# Patient Record
Sex: MALE | Race: BLACK OR AFRICAN AMERICAN | Employment: UNEMPLOYED | URBAN - METROPOLITAN AREA
[De-identification: names, ages, dates, MRNs, and addresses within clinical notes are randomized per-mention and may not be internally consistent; named-entity substitution may affect disease eponyms.]

---

## 2020-12-11 ENCOUNTER — HOSPITAL ENCOUNTER (EMERGENCY)
Age: 40
Discharge: HOME | End: 2020-12-11
Attending: EMERGENCY MEDICINE
Payer: COMMERCIAL

## 2020-12-11 VITALS
TEMPERATURE: 96.9 F | HEART RATE: 77 BPM | DIASTOLIC BLOOD PRESSURE: 109 MMHG | RESPIRATION RATE: 16 BRPM | OXYGEN SATURATION: 96 % | SYSTOLIC BLOOD PRESSURE: 156 MMHG

## 2020-12-11 LAB
ABSOLUTE EOS #: 0.16 K/UL (ref 0–0.44)
ABSOLUTE IMMATURE GRANULOCYTE: 0.03 K/UL (ref 0–0.3)
ABSOLUTE LYMPH #: 2.94 K/UL (ref 1.1–3.7)
ABSOLUTE MONO #: 0.9 K/UL (ref 0.1–1.2)
ALBUMIN SERPL-MCNC: 4.4 G/DL (ref 3.5–5.2)
ALBUMIN/GLOBULIN RATIO: 1.3 (ref 1–2.5)
ALP BLD-CCNC: 45 U/L (ref 40–129)
ALT SERPL-CCNC: 26 U/L (ref 5–41)
ANION GAP SERPL CALCULATED.3IONS-SCNC: 13 MMOL/L (ref 9–17)
AST SERPL-CCNC: 20 U/L
BASOPHILS # BLD: 1 % (ref 0–2)
BASOPHILS ABSOLUTE: 0.08 K/UL (ref 0–0.2)
BILIRUB SERPL-MCNC: 1.55 MG/DL (ref 0.3–1.2)
BUN BLDV-MCNC: 12 MG/DL (ref 6–20)
BUN/CREAT BLD: ABNORMAL (ref 9–20)
CALCIUM SERPL-MCNC: 9.4 MG/DL (ref 8.6–10.4)
CHLORIDE BLD-SCNC: 98 MMOL/L (ref 98–107)
CO2: 24 MMOL/L (ref 20–31)
CREAT SERPL-MCNC: 0.9 MG/DL (ref 0.7–1.2)
DIFFERENTIAL TYPE: ABNORMAL
EOSINOPHILS RELATIVE PERCENT: 1 % (ref 1–4)
GFR AFRICAN AMERICAN: >60 ML/MIN
GFR NON-AFRICAN AMERICAN: >60 ML/MIN
GFR SERPL CREATININE-BSD FRML MDRD: ABNORMAL ML/MIN/{1.73_M2}
GFR SERPL CREATININE-BSD FRML MDRD: ABNORMAL ML/MIN/{1.73_M2}
GLUCOSE BLD-MCNC: 134 MG/DL (ref 70–99)
HCT VFR BLD CALC: 42.8 % (ref 40.7–50.3)
HEMOGLOBIN: 14.2 G/DL (ref 13–17)
IMMATURE GRANULOCYTES: 0 %
LIPASE: 66 U/L (ref 13–60)
LYMPHOCYTES # BLD: 24 % (ref 24–43)
MCH RBC QN AUTO: 30.9 PG (ref 25.2–33.5)
MCHC RBC AUTO-ENTMCNC: 33.2 G/DL (ref 28.4–34.8)
MCV RBC AUTO: 93.2 FL (ref 82.6–102.9)
MONOCYTES # BLD: 8 % (ref 3–12)
NRBC AUTOMATED: 0 PER 100 WBC
PDW BLD-RTO: 11.9 % (ref 11.8–14.4)
PLATELET # BLD: 347 K/UL (ref 138–453)
PLATELET ESTIMATE: ABNORMAL
PMV BLD AUTO: 11 FL (ref 8.1–13.5)
POTASSIUM SERPL-SCNC: 3.5 MMOL/L (ref 3.7–5.3)
RBC # BLD: 4.59 M/UL (ref 4.21–5.77)
RBC # BLD: ABNORMAL 10*6/UL
SEG NEUTROPHILS: 66 % (ref 36–65)
SEGMENTED NEUTROPHILS ABSOLUTE COUNT: 7.94 K/UL (ref 1.5–8.1)
SODIUM BLD-SCNC: 135 MMOL/L (ref 135–144)
TOTAL PROTEIN: 7.7 G/DL (ref 6.4–8.3)
WBC # BLD: 12.1 K/UL (ref 3.5–11.3)
WBC # BLD: ABNORMAL 10*3/UL

## 2020-12-11 PROCEDURE — 96375 TX/PRO/DX INJ NEW DRUG ADDON: CPT

## 2020-12-11 PROCEDURE — 6370000000 HC RX 637 (ALT 250 FOR IP): Performed by: STUDENT IN AN ORGANIZED HEALTH CARE EDUCATION/TRAINING PROGRAM

## 2020-12-11 PROCEDURE — 2580000003 HC RX 258: Performed by: STUDENT IN AN ORGANIZED HEALTH CARE EDUCATION/TRAINING PROGRAM

## 2020-12-11 PROCEDURE — 99284 EMERGENCY DEPT VISIT MOD MDM: CPT

## 2020-12-11 PROCEDURE — 6360000002 HC RX W HCPCS: Performed by: STUDENT IN AN ORGANIZED HEALTH CARE EDUCATION/TRAINING PROGRAM

## 2020-12-11 PROCEDURE — 2500000003 HC RX 250 WO HCPCS: Performed by: STUDENT IN AN ORGANIZED HEALTH CARE EDUCATION/TRAINING PROGRAM

## 2020-12-11 PROCEDURE — 85025 COMPLETE CBC W/AUTO DIFF WBC: CPT

## 2020-12-11 PROCEDURE — 96374 THER/PROPH/DIAG INJ IV PUSH: CPT

## 2020-12-11 PROCEDURE — 83690 ASSAY OF LIPASE: CPT

## 2020-12-11 PROCEDURE — 80053 COMPREHEN METABOLIC PANEL: CPT

## 2020-12-11 PROCEDURE — 93005 ELECTROCARDIOGRAM TRACING: CPT | Performed by: STUDENT IN AN ORGANIZED HEALTH CARE EDUCATION/TRAINING PROGRAM

## 2020-12-11 RX ORDER — ONDANSETRON 2 MG/ML
4 INJECTION INTRAMUSCULAR; INTRAVENOUS ONCE
Status: COMPLETED | OUTPATIENT
Start: 2020-12-11 | End: 2020-12-11

## 2020-12-11 RX ORDER — PANTOPRAZOLE SODIUM 20 MG/1
40 TABLET, DELAYED RELEASE ORAL 2 TIMES DAILY
Qty: 30 TABLET | Refills: 0 | Status: ON HOLD | OUTPATIENT
Start: 2020-12-11 | End: 2021-03-28 | Stop reason: HOSPADM

## 2020-12-11 RX ORDER — ALUMINA, MAGNESIA, AND SIMETHICONE 2400; 2400; 240 MG/30ML; MG/30ML; MG/30ML
15 SUSPENSION ORAL EVERY 6 HOURS PRN
Qty: 1 BOTTLE | Refills: 0 | Status: SHIPPED | OUTPATIENT
Start: 2020-12-11 | End: 2021-07-27 | Stop reason: ALTCHOICE

## 2020-12-11 RX ORDER — MAGNESIUM HYDROXIDE/ALUMINUM HYDROXICE/SIMETHICONE 120; 1200; 1200 MG/30ML; MG/30ML; MG/30ML
30 SUSPENSION ORAL ONCE
Status: COMPLETED | OUTPATIENT
Start: 2020-12-11 | End: 2020-12-11

## 2020-12-11 RX ORDER — 0.9 % SODIUM CHLORIDE 0.9 %
1000 INTRAVENOUS SOLUTION INTRAVENOUS ONCE
Status: COMPLETED | OUTPATIENT
Start: 2020-12-11 | End: 2020-12-11

## 2020-12-11 RX ADMIN — ONDANSETRON 4 MG: 2 INJECTION INTRAMUSCULAR; INTRAVENOUS at 07:01

## 2020-12-11 RX ADMIN — FAMOTIDINE 20 MG: 10 INJECTION INTRAVENOUS at 07:01

## 2020-12-11 RX ADMIN — SODIUM CHLORIDE 1000 ML: 9 INJECTION, SOLUTION INTRAVENOUS at 07:01

## 2020-12-11 RX ADMIN — ALUMINUM HYDROXIDE, MAGNESIUM HYDROXIDE, AND SIMETHICONE 30 ML: 200; 200; 20 SUSPENSION ORAL at 06:58

## 2020-12-11 ASSESSMENT — ENCOUNTER SYMPTOMS
NAUSEA: 1
SHORTNESS OF BREATH: 0
EYE ITCHING: 0
EYE REDNESS: 0
DIARRHEA: 0
RHINORRHEA: 0
CONSTIPATION: 0
BLOOD IN STOOL: 0
SORE THROAT: 0
VOMITING: 1
COUGH: 0
ABDOMINAL PAIN: 1

## 2020-12-11 ASSESSMENT — PAIN DESCRIPTION - LOCATION: LOCATION: ABDOMEN

## 2020-12-11 ASSESSMENT — PAIN SCALES - GENERAL: PAINLEVEL_OUTOF10: 8

## 2020-12-11 ASSESSMENT — PAIN DESCRIPTION - ORIENTATION: ORIENTATION: MID

## 2020-12-11 ASSESSMENT — PAIN DESCRIPTION - PAIN TYPE: TYPE: ACUTE PAIN;CHRONIC PAIN

## 2020-12-11 NOTE — ED PROVIDER NOTES
[facility]  ED  Emergency Department Encounter  Emergency Medicine Resident     Pt Name: Ramón Moura  MRN: [id number]  [place] 1980  Date ofevaluation: 12/11/20  PCP:  No primary care provider on file. CHIEF COMPLAINT       Chief Complaint   Patient presents with    Abdominal Pain     ulcers, midsternal     HISTORY OF PRESENT ILLNESS  (Location/Symptom, Timing/Onset, Context/Setting, Quality, Duration, Modifying Factors, Severity, Associated signs/symptoms)     Ramón Moura is a 38 y.o. male who presents with \"stomach ulcers \". Patient reports that for last several days he has had pain in his epigastric region that has had before from what he believes are stomach ulcers. He has never had a EGD performed, states these are stomach ulcers from the symptoms that he looked up online. He reports that his pain is been constant since onset and was accompanied by some coffee-ground emesis approximately 2 days ago. Pain does not radiate and is not accompanied with diarrhea or melena or bloody stools. No fevers, chills, chest pain, shortness of breath, or other concerns. He does smoke tobacco and occasionally use marijuana but not daily. Does also occasional use alcohol none recently. No prior history of abdominal surgeries. PAST MEDICAL / SURGICAL / SOCIAL / FAMILY HISTORY      has no past medical history on file. has no past surgical history on file.     Social History     Socioeconomic History    Marital status: Single     Spouse name: Not on file    Number of children: Not on file    Years of education: Not on file    Highest education level: Not on file   Occupational History    Not on file   Social Needs    Financial resource strain: Not on file    Food insecurity     Worry: Not on file     Inability: Not on file    Transportation needs     Medical: Not on file     Non-medical: Not on file   Tobacco Use    Smoking status: Current Every Day Smoker     Packs/day: 0.50     Years: 20.00     Pack years: 10.00     Types: Cigarettes   Substance and Sexual Activity    Alcohol use: Yes     Comment: socially     Drug use: Yes     Types: Marijuana     Comment: occasionally     Sexual activity: Not on file   Lifestyle    Physical activity     Days per week: Not on file     Minutes per session: Not on file    Stress: Not on file   Relationships    Social connections     Talks on phone: Not on file     Gets together: Not on file     Attends Scientologist service: Not on file     Active member of club or organization: Not on file     Attends meetings of clubs or organizations: Not on file     Relationship status: Not on file    Intimate partner violence     Fear of current or ex partner: Not on file     Emotionally abused: Not on file     Physically abused: Not on file     Forced sexual activity: Not on file   Other Topics Concern    Not on file   Social History Narrative    Not on file       History reviewed. No pertinent family history. Allergies:  Patient has no allergy information on record. Home Medications:  Prior to Admission medications    Medication Sig Start Date End Date Taking? Authorizing Provider   pantoprazole (PROTONIX) 20 MG tablet Take 2 tablets by mouth 2 times daily 12/11/20  Yes Misael Peña, DO   aluminum & magnesium hydroxide-simethicone (MAALOX ADVANCED MAX ST) 400-400-40 MG/5ML SUSP Take 15 mLs by mouth every 6 hours as needed (Heartburn) 12/11/20  Yes Misael Peña, DO       REVIEW OF SYSTEMS    (2-9 systems for level 4, 10 or more for level 5)      Review of Systems   Constitutional: Negative for chills and fever. HENT: Negative for rhinorrhea and sore throat. Eyes: Negative for redness and itching. Respiratory: Negative for cough and shortness of breath. Cardiovascular: Negative for chest pain. Gastrointestinal: Positive for abdominal pain, nausea and vomiting. Negative for blood in stool, constipation and diarrhea. Genitourinary: Negative for dysuria and hematuria. Skin: Negative for rash and wound. Allergic/Immunologic: Negative for environmental allergies and food allergies. Neurological: Negative for weakness and numbness. PHYSICAL EXAM   (up to 7 for level 4, 8 or more for level 5)      INITIAL VITALS:   BP (!) 156/109   Pulse 77   Temp 96.9 °F (36.1 °C) (Temporal)   Resp 16   SpO2 96%     Physical Exam  Nursing note reviewed. Constitutional:       General: He is not in acute distress. Appearance: Normal appearance. He is obese. He is not ill-appearing, toxic-appearing or diaphoretic. HENT:      Head: Normocephalic and atraumatic. Eyes:      General: No scleral icterus. Conjunctiva/sclera: Conjunctivae normal.   Neck:      Musculoskeletal: Neck supple. Cardiovascular:      Rate and Rhythm: Normal rate and regular rhythm. Pulmonary:      Effort: Pulmonary effort is normal. No respiratory distress. Breath sounds: Normal breath sounds. No stridor. No wheezing, rhonchi or rales. Abdominal:      General: There is no distension. Palpations: Abdomen is soft. There is no mass. Tenderness: There is abdominal tenderness (mild) in the right upper quadrant, epigastric area and left upper quadrant. There is no right CVA tenderness, left CVA tenderness, guarding or rebound. Musculoskeletal:      Right lower leg: No edema. Left lower leg: No edema. Skin:     General: Skin is warm and dry. Findings: No rash (over exposed skin). Neurological:      General: No focal deficit present. Mental Status: He is alert and oriented to person, place, and time.    Psychiatric:         Mood and Affect: Mood normal.         Behavior: Behavior normal.         DIAGNOSTICS     PLAN (LABS / IMAGING / EKG):  Orders Placed This Encounter   Procedures    CBC WITH AUTO DIFFERENTIAL    COMPREHENSIVE METABOLIC PANEL    LIPASE    EKG 12 Lead       MEDICATIONS ORDERED:  Orders Placed This Encounter   Medications    0.9 % sodium chloride bolus    aluminum & magnesium hydroxide-simethicone (MAALOX) 200-200-20 MG/5ML suspension 30 mL    ondansetron (ZOFRAN) injection 4 mg    famotidine (PEPCID) injection 20 mg    pantoprazole (PROTONIX) 20 MG tablet     Sig: Take 2 tablets by mouth 2 times daily     Dispense:  30 tablet     Refill:  0    aluminum & magnesium hydroxide-simethicone (MAALOX ADVANCED MAX ST) 400-400-40 MG/5ML SUSP     Sig: Take 15 mLs by mouth every 6 hours as needed (Heartburn)     Dispense:  1 Bottle     Refill:  0       DIAGNOSTIC RESULTS / EMERGENCYDEPARTMENT COURSE / MDM     LABS:  Results for orders placed or performed during the hospital encounter of 12/11/20   CBC WITH AUTO DIFFERENTIAL   Result Value Ref Range    WBC 12.1 (H) 3.5 - 11.3 k/uL    RBC 4.59 4.21 - 5.77 m/uL    Hemoglobin 14.2 13.0 - 17.0 g/dL    Hematocrit 42.8 40.7 - 50.3 %    MCV 93.2 82.6 - 102.9 fL    MCH 30.9 25.2 - 33.5 pg    MCHC 33.2 28.4 - 34.8 g/dL    RDW 11.9 11.8 - 14.4 %    Platelets 960 196 - 866 k/uL    MPV 11.0 8.1 - 13.5 fL    NRBC Automated 0.0 0.0 per 100 WBC    Differential Type NOT REPORTED     Seg Neutrophils 66 (H) 36 - 65 %    Lymphocytes 24 24 - 43 %    Monocytes 8 3 - 12 %    Eosinophils % 1 1 - 4 %    Basophils 1 0 - 2 %    Immature Granulocytes 0 0 %    Segs Absolute 7.94 1.50 - 8.10 k/uL    Absolute Lymph # 2.94 1.10 - 3.70 k/uL    Absolute Mono # 0.90 0.10 - 1.20 k/uL    Absolute Eos # 0.16 0.00 - 0.44 k/uL    Basophils Absolute 0.08 0.00 - 0.20 k/uL    Absolute Immature Granulocyte 0.03 0.00 - 0.30 k/uL    WBC Morphology NOT REPORTED     RBC Morphology NOT REPORTED     Platelet Estimate NOT REPORTED    COMPREHENSIVE METABOLIC PANEL   Result Value Ref Range    Glucose 134 (H) 70 - 99 mg/dL    BUN 12 6 - 20 mg/dL    CREATININE 0.90 0.70 - 1.20 mg/dL    Bun/Cre Ratio NOT REPORTED 9 - 20    Calcium 9.4 8.6 - 10.4 mg/dL    Sodium 135 135 - 144 mmol/L    Potassium 3.5 (L) 3.7 - 5.3 mmol/L Chloride 98 98 - 107 mmol/L    CO2 24 20 - 31 mmol/L    Anion Gap 13 9 - 17 mmol/L    Alkaline Phosphatase 45 40 - 129 U/L    ALT 26 5 - 41 U/L    AST 20 <40 U/L    Total Bilirubin 1.55 (H) 0.3 - 1.2 mg/dL    Total Protein 7.7 6.4 - 8.3 g/dL    Alb 4.4 3.5 - 5.2 g/dL    Albumin/Globulin Ratio 1.3 1.0 - 2.5    GFR Non-African American >60 >60 mL/min    GFR African American >60 >60 mL/min    GFR Comment          GFR Staging NOT REPORTED    LIPASE   Result Value Ref Range    Lipase 66 (H) 13 - 60 U/L       RADIOLOGY:  No orders to display      EKG  Rhythm: normal sinus   Rate: normal  Axis: normal  Ectopy: none  Conduction: normal  ST Segments: normal  T Waves: Poor T wave balance, TWI in III, otherwise normal  Q Waves: none    Clinical Impression: non-specific EKG    Normal Interval Reference:  P-wave <110 ms  -200 ms  QRS <100 ms  QT <420 ms  QTc 330-470 ms    All EKG's are interpreted by the Emergency Department Physician who either signs or Co-signsthis chart in the absence of a cardiologist.    EMERGENCY DEPARTMENT COURSE:         MDM: 25-year-old male presenting with acute onset epigastric dental pain that he states is from stomach ulcers although no prior history of EGDs or other formal diagnosis of such. On exam he is well-appearing no acute distress, vitals unremarkable. Heart regular rate and rhythm, lungs are clear to auscultation bilaterally abdomen soft with some mild tenderness in epigastric and right upper quadrant and left upper quadrant region. No rebound or guarding noted. No CVA tenderness. No lower extremity edema noted. Differential diagnose includes gastritis, peptic ulcer disease, pancreatitis, cholecystitis, ACS, among others. Plan is for abdominal labs, EKG, symptomatic treatment and reassess. Work-up unremarkable patient feels significantly improved after Maalox and Zofran. Impression is likely gastritis versus PUD.   Repeat abdominal exam is much improved without any

## 2020-12-11 NOTE — ED PROVIDER NOTES
[facility] ED  eMERGENCY dEPARTMENT eNCOUnter   Attending Attestation     Pt Name: Ellis Canas  MRN: [id number]  [place] 1980  Date of evaluation: 12/11/20       Ellis Canas is a 38 y.o. male who presents with Abdominal Pain (ulcers, midsternal)      History: Pt presents with epigastric pain pt states he has ulcers. Exam: HRRR, lungs CTABL, abdomen soft and tender over epigastrium. Pt well appearing. Plan for labs and treatment. Plan for discharge if negative workup. EKG Interpretation    Interpreted by emergency department physician    Rhythm: normal sinus   Rate: normal  Axis: normal  Ectopy: none  Conduction: normal  ST Segments: no acute change  T Waves: inversion lead 3  Q Waves: none    Clinical Impression: non-specific EKG      Leandro De Paz M.D. I performed a history and physical examination of the patient and discussed management with the resident. I reviewed the residents note and agree with the documented findings and plan of care. Any areas of disagreement are noted on the chart. I was personally present for the key portions of any procedures. I have documented in the chart those procedures where I was not present during the key portions. I have personally reviewed all images and agree with the resident's interpretation. I have reviewed the emergency nurses triage note. I agree with the chief complaint, past medical history, past surgical history, allergies, medications, social and family history as documented unless otherwise noted below. Documentation of the HPI, Physical Exam and Medical Decision Making performed by medical students or scribes is based on my personal performance of the HPI, PE and MDM. For Phys Assistant/ Nurse Practitioner cases/documentation I have had a face to face evaluation of this patient and have completed at least one if not all key elements of the E/M (history, physical exam, and MDM). Additional findings are as noted.     For [employer] cases I have personally evaluated and examined the patient in conjunction with the APC and agree with the treatment plan and disposition of the patient as recorded by the APC.     Jared De Santiago MD  Attending Emergency  Physician       Aaliyah Thorpe MD  12/11/20 [facility] [place]  12/11/20 2501

## 2020-12-11 NOTE — ED TRIAGE NOTES
Pt came in having midsternal abdominal pain with nausea. PT states it has been going on a week worsening since Sunday morning. Pt states has hx of stomach ulcers. Vitals completed. EKG done and connected to cardiac monitor.

## 2020-12-12 LAB
EKG ATRIAL RATE: 74 BPM
EKG P AXIS: 47 DEGREES
EKG P-R INTERVAL: 126 MS
EKG Q-T INTERVAL: 390 MS
EKG QRS DURATION: 104 MS
EKG QTC CALCULATION (BAZETT): 432 MS
EKG R AXIS: 66 DEGREES
EKG T AXIS: 9 DEGREES
EKG VENTRICULAR RATE: 74 BPM

## 2021-03-25 ENCOUNTER — HOSPITAL ENCOUNTER (INPATIENT)
Age: 41
LOS: 3 days | Discharge: HOME | DRG: 254 | End: 2021-03-28
Attending: EMERGENCY MEDICINE | Admitting: INTERNAL MEDICINE
Payer: COMMERCIAL

## 2021-03-25 DIAGNOSIS — E86.0 DEHYDRATION: ICD-10-CM

## 2021-03-25 DIAGNOSIS — K92.1 GASTROINTESTINAL HEMORRHAGE WITH MELENA: Primary | ICD-10-CM

## 2021-03-25 PROBLEM — K92.2 GI BLEED: Status: ACTIVE | Noted: 2021-03-25

## 2021-03-25 LAB
ABSOLUTE EOS #: 0.07 K/UL (ref 0–0.44)
ABSOLUTE IMMATURE GRANULOCYTE: 0.05 K/UL (ref 0–0.3)
ABSOLUTE LYMPH #: 2.4 K/UL (ref 1.1–3.7)
ABSOLUTE MONO #: 0.9 K/UL (ref 0.1–1.2)
ALBUMIN SERPL-MCNC: 4.7 G/DL (ref 3.5–5.2)
ALBUMIN/GLOBULIN RATIO: 1.2 (ref 1–2.5)
ALP BLD-CCNC: 54 U/L (ref 40–129)
ALT SERPL-CCNC: 18 U/L (ref 5–41)
ANION GAP SERPL CALCULATED.3IONS-SCNC: 14 MMOL/L (ref 9–17)
AST SERPL-CCNC: 16 U/L
BASOPHILS # BLD: 1 % (ref 0–2)
BASOPHILS ABSOLUTE: 0.08 K/UL (ref 0–0.2)
BILIRUB SERPL-MCNC: 0.96 MG/DL (ref 0.3–1.2)
BUN BLDV-MCNC: 23 MG/DL (ref 6–20)
BUN/CREAT BLD: ABNORMAL (ref 9–20)
CALCIUM SERPL-MCNC: 10.5 MG/DL (ref 8.6–10.4)
CHLORIDE BLD-SCNC: 92 MMOL/L (ref 98–107)
CO2: 26 MMOL/L (ref 20–31)
CREAT SERPL-MCNC: 1.02 MG/DL (ref 0.7–1.2)
DIFFERENTIAL TYPE: ABNORMAL
EOSINOPHILS RELATIVE PERCENT: 1 % (ref 1–4)
GFR AFRICAN AMERICAN: >60 ML/MIN
GFR NON-AFRICAN AMERICAN: >60 ML/MIN
GFR SERPL CREATININE-BSD FRML MDRD: ABNORMAL ML/MIN/{1.73_M2}
GFR SERPL CREATININE-BSD FRML MDRD: ABNORMAL ML/MIN/{1.73_M2}
GLUCOSE BLD-MCNC: 151 MG/DL (ref 70–99)
HCT VFR BLD CALC: 48.2 % (ref 40.7–50.3)
HEMOGLOBIN: 16.5 G/DL (ref 13–17)
IMMATURE GRANULOCYTES: 0 %
INR BLD: 1.1
LACTIC ACID, WHOLE BLOOD: 2.1 MMOL/L (ref 0.7–2.1)
LIPASE: 70 U/L (ref 13–60)
LYMPHOCYTES # BLD: 16 % (ref 24–43)
MAGNESIUM: 2.3 MG/DL (ref 1.6–2.6)
MCH RBC QN AUTO: 30.8 PG (ref 25.2–33.5)
MCHC RBC AUTO-ENTMCNC: 34.2 G/DL (ref 28.4–34.8)
MCV RBC AUTO: 89.9 FL (ref 82.6–102.9)
MONOCYTES # BLD: 6 % (ref 3–12)
NRBC AUTOMATED: 0.7 PER 100 WBC
PARTIAL THROMBOPLASTIN TIME: 24.1 SEC (ref 20.5–30.5)
PDW BLD-RTO: 12.2 % (ref 11.8–14.4)
PLATELET # BLD: 435 K/UL (ref 138–453)
PLATELET ESTIMATE: ABNORMAL
PMV BLD AUTO: 10.8 FL (ref 8.1–13.5)
POTASSIUM SERPL-SCNC: 3.4 MMOL/L (ref 3.7–5.3)
PROTHROMBIN TIME: 11.5 SEC (ref 9.1–12.3)
RBC # BLD: 5.36 M/UL (ref 4.21–5.77)
RBC # BLD: ABNORMAL 10*6/UL
SEG NEUTROPHILS: 76 % (ref 36–65)
SEGMENTED NEUTROPHILS ABSOLUTE COUNT: 11.35 K/UL (ref 1.5–8.1)
SODIUM BLD-SCNC: 132 MMOL/L (ref 135–144)
TOTAL PROTEIN: 8.7 G/DL (ref 6.4–8.3)
WBC # BLD: 14.9 K/UL (ref 3.5–11.3)
WBC # BLD: ABNORMAL 10*3/UL

## 2021-03-25 PROCEDURE — 2580000003 HC RX 258: Performed by: STUDENT IN AN ORGANIZED HEALTH CARE EDUCATION/TRAINING PROGRAM

## 2021-03-25 PROCEDURE — C9113 INJ PANTOPRAZOLE SODIUM, VIA: HCPCS | Performed by: STUDENT IN AN ORGANIZED HEALTH CARE EDUCATION/TRAINING PROGRAM

## 2021-03-25 PROCEDURE — G0378 HOSPITAL OBSERVATION PER HR: HCPCS

## 2021-03-25 PROCEDURE — 85610 PROTHROMBIN TIME: CPT

## 2021-03-25 PROCEDURE — 99285 EMERGENCY DEPT VISIT HI MDM: CPT

## 2021-03-25 PROCEDURE — 83735 ASSAY OF MAGNESIUM: CPT

## 2021-03-25 PROCEDURE — 6360000002 HC RX W HCPCS: Performed by: STUDENT IN AN ORGANIZED HEALTH CARE EDUCATION/TRAINING PROGRAM

## 2021-03-25 PROCEDURE — 96361 HYDRATE IV INFUSION ADD-ON: CPT

## 2021-03-25 PROCEDURE — 83690 ASSAY OF LIPASE: CPT

## 2021-03-25 PROCEDURE — 93005 ELECTROCARDIOGRAM TRACING: CPT | Performed by: STUDENT IN AN ORGANIZED HEALTH CARE EDUCATION/TRAINING PROGRAM

## 2021-03-25 PROCEDURE — 96365 THER/PROPH/DIAG IV INF INIT: CPT

## 2021-03-25 PROCEDURE — 96375 TX/PRO/DX INJ NEW DRUG ADDON: CPT

## 2021-03-25 PROCEDURE — 83605 ASSAY OF LACTIC ACID: CPT

## 2021-03-25 PROCEDURE — 85025 COMPLETE CBC W/AUTO DIFF WBC: CPT

## 2021-03-25 PROCEDURE — 80053 COMPREHEN METABOLIC PANEL: CPT

## 2021-03-25 PROCEDURE — 1200000000 HC SEMI PRIVATE

## 2021-03-25 PROCEDURE — 85730 THROMBOPLASTIN TIME PARTIAL: CPT

## 2021-03-25 RX ORDER — PANTOPRAZOLE SODIUM 40 MG/10ML
40 INJECTION, POWDER, LYOPHILIZED, FOR SOLUTION INTRAVENOUS ONCE
Status: COMPLETED | OUTPATIENT
Start: 2021-03-25 | End: 2021-03-25

## 2021-03-25 RX ORDER — SODIUM CHLORIDE 9 MG/ML
10 INJECTION INTRAVENOUS ONCE
Status: COMPLETED | OUTPATIENT
Start: 2021-03-25 | End: 2021-03-25

## 2021-03-25 RX ORDER — POTASSIUM CHLORIDE 7.45 MG/ML
10 INJECTION INTRAVENOUS ONCE
Status: COMPLETED | OUTPATIENT
Start: 2021-03-25 | End: 2021-03-26

## 2021-03-25 RX ORDER — SODIUM CHLORIDE, SODIUM LACTATE, POTASSIUM CHLORIDE, CALCIUM CHLORIDE 600; 310; 30; 20 MG/100ML; MG/100ML; MG/100ML; MG/100ML
1000 INJECTION, SOLUTION INTRAVENOUS ONCE
Status: COMPLETED | OUTPATIENT
Start: 2021-03-25 | End: 2021-03-25

## 2021-03-25 RX ORDER — ONDANSETRON 2 MG/ML
4 INJECTION INTRAMUSCULAR; INTRAVENOUS ONCE
Status: COMPLETED | OUTPATIENT
Start: 2021-03-25 | End: 2021-03-25

## 2021-03-25 RX ORDER — MORPHINE SULFATE 4 MG/ML
4 INJECTION, SOLUTION INTRAMUSCULAR; INTRAVENOUS ONCE
Status: COMPLETED | OUTPATIENT
Start: 2021-03-25 | End: 2021-03-25

## 2021-03-25 RX ADMIN — PANTOPRAZOLE SODIUM 40 MG: 40 INJECTION, POWDER, FOR SOLUTION INTRAVENOUS at 22:38

## 2021-03-25 RX ADMIN — MORPHINE SULFATE 4 MG: 4 INJECTION INTRAVENOUS at 22:24

## 2021-03-25 RX ADMIN — ONDANSETRON 4 MG: 2 INJECTION INTRAMUSCULAR; INTRAVENOUS at 22:25

## 2021-03-25 RX ADMIN — POTASSIUM CHLORIDE 10 MEQ: 7.46 INJECTION, SOLUTION INTRAVENOUS at 23:18

## 2021-03-25 RX ADMIN — SODIUM CHLORIDE 10 ML: 9 INJECTION, SOLUTION INTRAMUSCULAR; INTRAVENOUS; SUBCUTANEOUS at 22:38

## 2021-03-25 RX ADMIN — SODIUM CHLORIDE, POTASSIUM CHLORIDE, SODIUM LACTATE AND CALCIUM CHLORIDE 1000 ML: 600; 310; 30; 20 INJECTION, SOLUTION INTRAVENOUS at 22:24

## 2021-03-25 NOTE — Clinical Note
Patient Class: Observation [104]   REQUIRED: Diagnosis: GI bleed [840388]   Estimated Length of Stay: Estimated stay of less than 2 midnights   Telemetry Bed Required?: Yes

## 2021-03-26 ENCOUNTER — ANESTHESIA EVENT (OUTPATIENT)
Dept: ENDOSCOPY | Age: 41
DRG: 254 | End: 2021-03-26
Payer: COMMERCIAL

## 2021-03-26 ENCOUNTER — ANESTHESIA (OUTPATIENT)
Dept: ENDOSCOPY | Age: 41
DRG: 254 | End: 2021-03-26
Payer: COMMERCIAL

## 2021-03-26 ENCOUNTER — APPOINTMENT (OUTPATIENT)
Dept: CT IMAGING | Age: 41
DRG: 254 | End: 2021-03-26
Payer: COMMERCIAL

## 2021-03-26 VITALS
DIASTOLIC BLOOD PRESSURE: 69 MMHG | RESPIRATION RATE: 13 BRPM | SYSTOLIC BLOOD PRESSURE: 118 MMHG | OXYGEN SATURATION: 94 %

## 2021-03-26 PROBLEM — K26.9 DUODENAL ULCER: Status: ACTIVE | Noted: 2021-03-26

## 2021-03-26 PROBLEM — T43.615A CAFFEINE ADVERSE REACTION: Status: ACTIVE | Noted: 2021-03-26

## 2021-03-26 PROBLEM — K92.2 ACUTE GI BLEEDING: Status: ACTIVE | Noted: 2021-03-26

## 2021-03-26 PROBLEM — E66.3 OVERWEIGHT (BMI 25.0-29.9): Status: ACTIVE | Noted: 2021-03-26

## 2021-03-26 PROBLEM — F17.200 SMOKER: Status: ACTIVE | Noted: 2021-03-26

## 2021-03-26 LAB
ANION GAP SERPL CALCULATED.3IONS-SCNC: 10 MMOL/L (ref 9–17)
BUN BLDV-MCNC: 19 MG/DL (ref 6–20)
BUN/CREAT BLD: ABNORMAL (ref 9–20)
CALCIUM SERPL-MCNC: 9 MG/DL (ref 8.6–10.4)
CHLORIDE BLD-SCNC: 98 MMOL/L (ref 98–107)
CO2: 25 MMOL/L (ref 20–31)
CREAT SERPL-MCNC: 0.81 MG/DL (ref 0.7–1.2)
EKG ATRIAL RATE: 108 BPM
EKG P AXIS: 49 DEGREES
EKG P-R INTERVAL: 116 MS
EKG Q-T INTERVAL: 330 MS
EKG QRS DURATION: 102 MS
EKG QTC CALCULATION (BAZETT): 442 MS
EKG R AXIS: 70 DEGREES
EKG T AXIS: 31 DEGREES
EKG VENTRICULAR RATE: 108 BPM
GFR AFRICAN AMERICAN: >60 ML/MIN
GFR NON-AFRICAN AMERICAN: >60 ML/MIN
GFR SERPL CREATININE-BSD FRML MDRD: ABNORMAL ML/MIN/{1.73_M2}
GFR SERPL CREATININE-BSD FRML MDRD: ABNORMAL ML/MIN/{1.73_M2}
GLUCOSE BLD-MCNC: 96 MG/DL (ref 70–99)
HCT VFR BLD CALC: 35 % (ref 40.7–50.3)
HCT VFR BLD CALC: 35.7 % (ref 40.7–50.3)
HCT VFR BLD CALC: 37.4 % (ref 40.7–50.3)
HCT VFR BLD CALC: 39 % (ref 40.7–50.3)
HEMOGLOBIN: 11.1 G/DL (ref 13–17)
HEMOGLOBIN: 11.6 G/DL (ref 13–17)
HEMOGLOBIN: 12.3 G/DL (ref 13–17)
HEMOGLOBIN: 12.8 G/DL (ref 13–17)
IRON SATURATION: 27 % (ref 20–55)
IRON: 90 UG/DL (ref 59–158)
MAGNESIUM: 2.1 MG/DL (ref 1.6–2.6)
POTASSIUM SERPL-SCNC: 3.5 MMOL/L (ref 3.7–5.3)
SARS-COV-2, RAPID: NOT DETECTED
SODIUM BLD-SCNC: 133 MMOL/L (ref 135–144)
SPECIMEN DESCRIPTION: NORMAL
TOTAL IRON BINDING CAPACITY: 329 UG/DL (ref 250–450)
UNSATURATED IRON BINDING CAPACITY: 239 UG/DL (ref 112–347)

## 2021-03-26 PROCEDURE — 88305 TISSUE EXAM BY PATHOLOGIST: CPT

## 2021-03-26 PROCEDURE — 99222 1ST HOSP IP/OBS MODERATE 55: CPT | Performed by: STUDENT IN AN ORGANIZED HEALTH CARE EDUCATION/TRAINING PROGRAM

## 2021-03-26 PROCEDURE — 74177 CT ABD & PELVIS W/CONTRAST: CPT

## 2021-03-26 PROCEDURE — 2709999900 HC NON-CHARGEABLE SUPPLY: Performed by: INTERNAL MEDICINE

## 2021-03-26 PROCEDURE — 2580000003 HC RX 258: Performed by: INTERNAL MEDICINE

## 2021-03-26 PROCEDURE — 3609012400 HC EGD TRANSORAL BIOPSY SINGLE/MULTIPLE: Performed by: INTERNAL MEDICINE

## 2021-03-26 PROCEDURE — 7100000011 HC PHASE II RECOVERY - ADDTL 15 MIN: Performed by: INTERNAL MEDICINE

## 2021-03-26 PROCEDURE — 83540 ASSAY OF IRON: CPT

## 2021-03-26 PROCEDURE — 2500000003 HC RX 250 WO HCPCS: Performed by: NURSE ANESTHETIST, CERTIFIED REGISTERED

## 2021-03-26 PROCEDURE — 83550 IRON BINDING TEST: CPT

## 2021-03-26 PROCEDURE — 87635 SARS-COV-2 COVID-19 AMP PRB: CPT

## 2021-03-26 PROCEDURE — 3700000000 HC ANESTHESIA ATTENDED CARE: Performed by: INTERNAL MEDICINE

## 2021-03-26 PROCEDURE — 85018 HEMOGLOBIN: CPT

## 2021-03-26 PROCEDURE — 6360000002 HC RX W HCPCS: Performed by: NURSE ANESTHETIST, CERTIFIED REGISTERED

## 2021-03-26 PROCEDURE — 80048 BASIC METABOLIC PNL TOTAL CA: CPT

## 2021-03-26 PROCEDURE — 36415 COLL VENOUS BLD VENIPUNCTURE: CPT

## 2021-03-26 PROCEDURE — 6360000002 HC RX W HCPCS: Performed by: NURSE PRACTITIONER

## 2021-03-26 PROCEDURE — 6360000002 HC RX W HCPCS: Performed by: INTERNAL MEDICINE

## 2021-03-26 PROCEDURE — 88342 IMHCHEM/IMCYTCHM 1ST ANTB: CPT

## 2021-03-26 PROCEDURE — 43239 EGD BIOPSY SINGLE/MULTIPLE: CPT | Performed by: INTERNAL MEDICINE

## 2021-03-26 PROCEDURE — 6360000004 HC RX CONTRAST MEDICATION: Performed by: STUDENT IN AN ORGANIZED HEALTH CARE EDUCATION/TRAINING PROGRAM

## 2021-03-26 PROCEDURE — 7100000010 HC PHASE II RECOVERY - FIRST 15 MIN: Performed by: INTERNAL MEDICINE

## 2021-03-26 PROCEDURE — C9113 INJ PANTOPRAZOLE SODIUM, VIA: HCPCS | Performed by: INTERNAL MEDICINE

## 2021-03-26 PROCEDURE — 99284 EMERGENCY DEPT VISIT MOD MDM: CPT | Performed by: INTERNAL MEDICINE

## 2021-03-26 PROCEDURE — 2580000003 HC RX 258: Performed by: NURSE PRACTITIONER

## 2021-03-26 PROCEDURE — 87040 BLOOD CULTURE FOR BACTERIA: CPT

## 2021-03-26 PROCEDURE — 1200000000 HC SEMI PRIVATE

## 2021-03-26 PROCEDURE — 85014 HEMATOCRIT: CPT

## 2021-03-26 PROCEDURE — 0DB78ZX EXCISION OF STOMACH, PYLORUS, VIA NATURAL OR ARTIFICIAL OPENING ENDOSCOPIC, DIAGNOSTIC: ICD-10-PCS | Performed by: INTERNAL MEDICINE

## 2021-03-26 PROCEDURE — 83735 ASSAY OF MAGNESIUM: CPT

## 2021-03-26 RX ORDER — SODIUM CHLORIDE 0.9 % (FLUSH) 0.9 %
10 SYRINGE (ML) INJECTION EVERY 12 HOURS SCHEDULED
Status: DISCONTINUED | OUTPATIENT
Start: 2021-03-26 | End: 2021-03-28 | Stop reason: HOSPADM

## 2021-03-26 RX ORDER — SODIUM CHLORIDE 9 MG/ML
10 INJECTION INTRAVENOUS DAILY
Status: DISCONTINUED | OUTPATIENT
Start: 2021-03-29 | End: 2021-03-27

## 2021-03-26 RX ORDER — OXYCODONE HYDROCHLORIDE AND ACETAMINOPHEN 5; 325 MG/1; MG/1
1 TABLET ORAL PRN
Status: DISCONTINUED | OUTPATIENT
Start: 2021-03-26 | End: 2021-03-26

## 2021-03-26 RX ORDER — MORPHINE SULFATE 4 MG/ML
2 INJECTION, SOLUTION INTRAMUSCULAR; INTRAVENOUS EVERY 4 HOURS PRN
Status: DISCONTINUED | OUTPATIENT
Start: 2021-03-26 | End: 2021-03-28 | Stop reason: HOSPADM

## 2021-03-26 RX ORDER — PANTOPRAZOLE SODIUM 40 MG/10ML
40 INJECTION, POWDER, LYOPHILIZED, FOR SOLUTION INTRAVENOUS EVERY 12 HOURS
Status: DISCONTINUED | OUTPATIENT
Start: 2021-03-26 | End: 2021-03-26

## 2021-03-26 RX ORDER — LABETALOL HYDROCHLORIDE 5 MG/ML
5 INJECTION, SOLUTION INTRAVENOUS EVERY 10 MIN PRN
Status: DISCONTINUED | OUTPATIENT
Start: 2021-03-26 | End: 2021-03-26

## 2021-03-26 RX ORDER — PANTOPRAZOLE SODIUM 40 MG/10ML
40 INJECTION, POWDER, LYOPHILIZED, FOR SOLUTION INTRAVENOUS DAILY
Status: DISCONTINUED | OUTPATIENT
Start: 2021-03-29 | End: 2021-03-27

## 2021-03-26 RX ORDER — SODIUM CHLORIDE 0.9 % (FLUSH) 0.9 %
10 SYRINGE (ML) INJECTION PRN
Status: DISCONTINUED | OUTPATIENT
Start: 2021-03-26 | End: 2021-03-28 | Stop reason: HOSPADM

## 2021-03-26 RX ORDER — SODIUM CHLORIDE 9 MG/ML
10 INJECTION INTRAVENOUS EVERY 12 HOURS
Status: DISCONTINUED | OUTPATIENT
Start: 2021-03-26 | End: 2021-03-26

## 2021-03-26 RX ORDER — ACETAMINOPHEN 650 MG/1
650 SUPPOSITORY RECTAL EVERY 6 HOURS PRN
Status: DISCONTINUED | OUTPATIENT
Start: 2021-03-26 | End: 2021-03-28 | Stop reason: HOSPADM

## 2021-03-26 RX ORDER — SODIUM CHLORIDE 9 MG/ML
INJECTION, SOLUTION INTRAVENOUS CONTINUOUS
Status: DISCONTINUED | OUTPATIENT
Start: 2021-03-26 | End: 2021-03-28 | Stop reason: HOSPADM

## 2021-03-26 RX ORDER — ONDANSETRON 2 MG/ML
4 INJECTION INTRAMUSCULAR; INTRAVENOUS EVERY 6 HOURS PRN
Status: DISCONTINUED | OUTPATIENT
Start: 2021-03-26 | End: 2021-03-28 | Stop reason: HOSPADM

## 2021-03-26 RX ORDER — MEPERIDINE HYDROCHLORIDE 50 MG/ML
12.5 INJECTION INTRAMUSCULAR; INTRAVENOUS; SUBCUTANEOUS EVERY 5 MIN PRN
Status: DISCONTINUED | OUTPATIENT
Start: 2021-03-26 | End: 2021-03-26

## 2021-03-26 RX ORDER — DIPHENHYDRAMINE HYDROCHLORIDE 50 MG/ML
12.5 INJECTION INTRAMUSCULAR; INTRAVENOUS
Status: ACTIVE | OUTPATIENT
Start: 2021-03-26 | End: 2021-03-26

## 2021-03-26 RX ORDER — LIDOCAINE HYDROCHLORIDE 10 MG/ML
INJECTION, SOLUTION EPIDURAL; INFILTRATION; INTRACAUDAL; PERINEURAL PRN
Status: DISCONTINUED | OUTPATIENT
Start: 2021-03-26 | End: 2021-03-26 | Stop reason: SDUPTHER

## 2021-03-26 RX ORDER — OXYCODONE HYDROCHLORIDE AND ACETAMINOPHEN 5; 325 MG/1; MG/1
2 TABLET ORAL PRN
Status: DISCONTINUED | OUTPATIENT
Start: 2021-03-26 | End: 2021-03-26

## 2021-03-26 RX ORDER — ACETAMINOPHEN 325 MG/1
650 TABLET ORAL EVERY 6 HOURS PRN
Status: DISCONTINUED | OUTPATIENT
Start: 2021-03-26 | End: 2021-03-28 | Stop reason: HOSPADM

## 2021-03-26 RX ORDER — PROMETHAZINE HYDROCHLORIDE 12.5 MG/1
12.5 TABLET ORAL EVERY 6 HOURS PRN
Status: DISCONTINUED | OUTPATIENT
Start: 2021-03-26 | End: 2021-03-28 | Stop reason: HOSPADM

## 2021-03-26 RX ORDER — PROPOFOL 10 MG/ML
INJECTION, EMULSION INTRAVENOUS PRN
Status: DISCONTINUED | OUTPATIENT
Start: 2021-03-26 | End: 2021-03-26 | Stop reason: SDUPTHER

## 2021-03-26 RX ORDER — PROMETHAZINE HYDROCHLORIDE 25 MG/ML
6.25 INJECTION, SOLUTION INTRAMUSCULAR; INTRAVENOUS
Status: DISCONTINUED | OUTPATIENT
Start: 2021-03-26 | End: 2021-03-26

## 2021-03-26 RX ADMIN — PANTOPRAZOLE SODIUM 8 MG/HR: 40 INJECTION, POWDER, FOR SOLUTION INTRAVENOUS at 16:08

## 2021-03-26 RX ADMIN — PROPOFOL 90 MG: 10 INJECTION, EMULSION INTRAVENOUS at 14:10

## 2021-03-26 RX ADMIN — IOPAMIDOL 75 ML: 755 INJECTION, SOLUTION INTRAVENOUS at 07:50

## 2021-03-26 RX ADMIN — SODIUM CHLORIDE: 9 INJECTION, SOLUTION INTRAVENOUS at 00:12

## 2021-03-26 RX ADMIN — MORPHINE SULFATE 2 MG: 4 INJECTION INTRAVENOUS at 20:41

## 2021-03-26 RX ADMIN — PROPOFOL 70 MG: 10 INJECTION, EMULSION INTRAVENOUS at 14:11

## 2021-03-26 RX ADMIN — LIDOCAINE HYDROCHLORIDE 50 MG: 10 INJECTION, SOLUTION EPIDURAL; INFILTRATION; INTRACAUDAL; PERINEURAL at 14:10

## 2021-03-26 RX ADMIN — SODIUM CHLORIDE: 9 INJECTION, SOLUTION INTRAVENOUS at 06:09

## 2021-03-26 RX ADMIN — SODIUM CHLORIDE: 9 INJECTION, SOLUTION INTRAVENOUS at 14:03

## 2021-03-26 ASSESSMENT — ENCOUNTER SYMPTOMS
ABDOMINAL PAIN: 1
NAUSEA: 1
EYE DISCHARGE: 0
BLOOD IN STOOL: 1
RECTAL PAIN: 0
EYE PAIN: 0
CONSTIPATION: 0
VOMITING: 1
COUGH: 0
SHORTNESS OF BREATH: 0
DIARRHEA: 0
RHINORRHEA: 0

## 2021-03-26 ASSESSMENT — PAIN - FUNCTIONAL ASSESSMENT: PAIN_FUNCTIONAL_ASSESSMENT: 0-10

## 2021-03-26 ASSESSMENT — PAIN SCALES - GENERAL
PAINLEVEL_OUTOF10: 0
PAINLEVEL_OUTOF10: 4
PAINLEVEL_OUTOF10: 0
PAINLEVEL_OUTOF10: 0

## 2021-03-26 ASSESSMENT — LIFESTYLE VARIABLES: SMOKING_STATUS: 1

## 2021-03-26 NOTE — ED PROVIDER NOTES
Faculty Sign-Out Attestation  Handoff taken on the following patient from prior Attending Physician: Isacc Chambers    I was available and discussed any additional care issues that arose and coordinated the management plans with the resident(s) caring for the patient during my duty period. Any areas of disagreement with residents documentation of care or procedures are noted on the chart. I was personally present for the key portions of any/all procedures during my duty period. I have documented in the chart those procedures where I was not present during the key portions.     Vomiting, melena, protonix, zofran, fluids, admitted    Seven Young DO  Attending Physician     Seven Young DO  03/25/21 4458

## 2021-03-26 NOTE — ED NOTES
The following labs labeled with pt sticker and tubed:     [x] Lavender   [] on ice   [] Blue   [] Green/yellow  [] Green/black [] on ice  [] Pink  [] Red  [] Yellow     [] COVID-19 swab    [] Rapid     [] Urine Sample  [] Pelvic Cultures    [] Blood Cultures            Jelena Oliva RN  03/26/21 3416

## 2021-03-26 NOTE — ED PROVIDER NOTES
[facility]  Emergency Department  Faculty Attestation     I performed a history and physical examination of the patient and discussed management with the resident. I reviewed the residents note and agree with the documented findings and plan of care. Any areas of disagreement are noted on the chart. I was personally present for the key portions of any procedures. I have documented in the chart those procedures where I was not present during the key portions. I have reviewed the emergency nurses triage note. I agree with the chief complaint, past medical history, past surgical history, allergies, medications, social and family history as documented unless otherwise noted below. For Physician Assistant/ Nurse Practitioner cases/documentation I have personally evaluated this patient and have completed at least one if not all key elements of the E/M (history, physical exam, and MDM). Additional findings are as noted. Primary Care Physician:  No primary care provider on file. Screenings:  [unfilled]    CHIEF COMPLAINT       Chief Complaint   Patient presents with    Abdominal Pain    Nausea    Emesis       RECENT VITALS:   Temp: 97 °F (36.1 °C),  Pulse: 108, Resp: 20, BP: (!) 149/116    LABS:  Labs Reviewed   CBC WITH AUTO DIFFERENTIAL   COMPREHENSIVE METABOLIC PANEL W/ REFLEX TO MG FOR LOW K   LIPASE   APTT   PROTIME-INR   LACTIC ACID, WHOLE BLOOD       Radiology  No orders to display         EKG:   EKG Interpretation    Interpreted by me    Rhythm: normal sinus   Rate: Tachycardia  Axis: normal  Ectopy: none  Conduction: normal  ST Segments: no acute change  T Waves: Upright V1  Q Waves: none    Clinical Impression: Tachycardia, nonspecific    Attending Physician Additional  Notes    Patient is been ill for the past 3 to 4 days with diffuse abdominal pain nausea and vomiting, unable to keep any down. He now has melenic stools.   No history of ulcers or use of NSAIDs. He is an occasional alcohol drinker but not heavily and not much recently. No blood in his emesis. No fevers. No chest pain. On exam he is ill, pain score 10/10, tachycardic, hypertensive, afebrile, anicteric. No obvious pallor. Abdomen has minimal epigastric tenderness without rebound guarding distention or tympany. Impression is dehydration, upper GI bleed, gastritis, consider other bleeding source. Plan is IV access, fluids, antiemetics, analgesics, antacids, laboratory studies, reassess, admission. Anny Gan.  Blanca Sawant MD, [facility]  Attending Emergency  Physician                Maya Soto MD  03/25/21 0062

## 2021-03-26 NOTE — ANESTHESIA POSTPROCEDURE EVALUATION
POST- ANESTHESIA EVALUATION       Pt Name: Enriqueta Lee  MRN: [id number]  YOB: 1980  Date of evaluation: 3/26/2021  Time:  4:56 PM      /73   Pulse 65   Temp 98.1 °F (36.7 °C)   Resp 17   Ht 5' 7\" (1.702 m)   Wt 190 lb (86.2 kg)   SpO2 97%   BMI 29.76 kg/m²      Consciousness Level  Awake  Cardiopulmonary Status  Stable  Pain Adequately Treated YES  Nausea / Vomiting  NO  Adequate Hydration  YES  Anesthesia Related Complications NONE      Electronically signed by Aga Brown MD on 3/26/2021 at 4:56 PM       Department of Anesthesiology  Postprocedure Note    Patient: Enriqueta Lee  MRN: [id number]  YOB: 1980  Date of evaluation: 3/26/2021  Time:  4:55 PM     Procedure Summary     Date: 03/26/21 Room / Location: [address] [place] [address]    Anesthesia Start: 4545 Anesthesia Stop: 2285    Procedure: EGD BIOPSY (N/A ) Diagnosis: (Melena, Hemetemesis)    Surgeons: Azra Mayen MD Responsible Provider: Aga Brown MD    Anesthesia Type: MAC ASA Status: 2          Anesthesia Type: MAC    Flora Phase I: Flora Score: 10    Flora Phase II: Flora Score: 10    Last vitals: Reviewed and per EMR flowsheets.        Anesthesia Post Evaluation

## 2021-03-26 NOTE — OP NOTE
EGD      Patient:   Izabella Wan    :    1980    Facility:   [facility]   Referring/PCP: No primary care provider on file. Procedure:   Esophagogastroduodenoscopy with biopsy  Date:     3/26/2021   Endoscopist:  Sam Lancaster MD, [facility]    Indication:   Hematemesis and Melena    Postprocedure diagnosis:   Large duodenal ulcer with a high risk for rebleeding, gastritis -biopsied    Anesthesia:  MAC    Complications: None    EBL: None from the procedure    Specimen: Sent to the lab    Description of Procedure:  Informed consent was obtained from the patient after explanation of the procedure including indications, description of the procedure,  benefits and possible risks and complications of the procedure, and alternatives. Questions were answered. The patient's history was reviewed and a directed physical examination was performed prior to the procedure. Patient was monitored throughout the procedure with pulse oximetry and periodic assessment of vital signs. Patient was sedated as noted above. With the patient in the left lateral decubitus position, the Olympus videoendoscope was placed in the patient's mouth and under direct visualization passed into the esophagus. Visualization of the esophagus, stomach, and duodenum was performed during both introduction and withdrawal of the endoscope and retroflexed view of the proximal stomach was obtained. The scope was passed to the 2nd portion of the duodenum. The patient tolerated the procedure well and was taken to the recovery area in good condition. Findings[de-identified]   Esophagus: Normal  Stomach: Stomach had moderate gastritis in the antrum characterized by mucosal edema and small erosions. This was biopsied with cold biopsy forceps to rule out H. pylori infection. The rest of the stomach was otherwise normal.  Duodenum: Duodenal bulb had a large ulcer with some pigmented spots without any active bleeding.   The second portion of the duodenum was normal    Recommendations: PPI drip for 48 to 72 hours and then PPI twice daily. Keep n.p.o. today. If no further evidence of bleeding then may start clear liquids tomorrow. Avoid nonsteroidals.     Electronically signed by Sarina Stockton MD, FACG  on 3/26/2021 at 2:17 PM

## 2021-03-26 NOTE — ED NOTES
[id number] Transport here to take pt to GI  1340 Report to [place] [facility] [employer].      Rodriguez Burdick RN  03/26/21 1347

## 2021-03-26 NOTE — ED PROVIDER NOTES
[facility]  ED  Emergency Department Encounter  Emergency Medicine Resident     Pt Name: Ginette Schultz  MRN: [id number]  [place] 1980  Date of evaluation: 3/25/21  PCP:  No primary care provider on file. CHIEF COMPLAINT       Chief Complaint   Patient presents with    Abdominal Pain    Nausea    Emesis       HISTORY OFPRESENT ILLNESS  (Location/Symptom, Timing/Onset, Context/Setting, Quality, Duration, Modifying Factors,Severity.)      Ginette Schultz is a 38 y. o.yo male who presents with days of worsening abdominal pain. Patient states that he has not moved his bowels in about 4 days although the last time he did he did have a dark stool. Denies any bright red blood per rectum. Has had intermittent nausea and vomiting for the past 4 days. States it has been worsening over the past 4 days and that is why he came into the emergency department today. Denies any fevers, chills, chest pain, shortness of breath, issues urinating. Does not take any home medications, has not tried any medications prior to arrival.  Patient states that the pain is all over his abdomen,-crampy in nature, has persistently been worsening. No radiation of pain. Has not had any previous abdominal surgeries. Does state that he has been taking ibuprofen although only about 400 mg/day for the past few days due to a tooth ache. PAST MEDICAL / SURGICAL / SOCIAL / FAMILY HISTORY      has no past medical history on file. has no past surgical history on file. Social:  reports that he has been smoking cigarettes. He has a 10.00 pack-year smoking history. He does not have any smokeless tobacco history on file. He reports current alcohol use. He reports current drug use. Drug: Marijuana. Family Hx: No family history on file. Allergies:  Patient has no known allergies. Home Medications:  Prior to Admission medications    Medication Sig Start Date End Date Taking?  Authorizing Provider Abdomen is flat. Palpations: Abdomen is soft. Tenderness: There is generalized abdominal tenderness. There is no guarding or rebound. Negative signs include Ward's sign. Genitourinary:     Comments: Hemoccult positive, no hemorrhoids noted    Skin:     General: Skin is warm and dry. Neurological:      General: No focal deficit present. Mental Status: He is alert and oriented to person, place, and time. Psychiatric:         Mood and Affect: Mood normal.         Behavior: Behavior normal.         Thought Content: Thought content normal.          DIFFERENTIAL  DIAGNOSIS       DDX: Upper GI bleed versus lower GI bleed versus ulcer versus gastritis     Initial MDM/Plan: 38 y.o. male who presents with acute worsening of abdominal pain, nausea, vomiting. One episode of melanotic stool about 4 days ago. Patient presents with vital signs significant for tachycardia hypertensive mild increased respirations at 20. Patient otherwise GCS 15 alert, oriented, answering all questions appropriately. Patient provided with 1 L bolus in the emergency department, for Zofran, 4 morphine, 40 of IV Protonix. Patient's vital signs did stabilize tachycardia resolved to about 95, respirations did decrease. Blood pressure stabilized with control of pain. Labs significant for prerenal azotemia with BUN to creatinine ratio greater than 1. Likely secondary to dehydration. Hemoccult was positive. Mild elevated white blood cell count likely stress-induced, reactive, no suspicion for active infection. Potassium noted to be 3.4 did replace with milliequivalents IV. EKG no acute abnormalities. Discussed with Intermed team patient likely has GI bleed with secondary acute dehydration. Patient admitted to Intermed team for further work-up and management in stable condition.     DIAGNOSTIC RESULTS / EMERGENCYDEPARTMENT COURSE / MDM     LABS:  Labs Reviewed   CBC WITH AUTO DIFFERENTIAL - Abnormal; Notable for the following components:       Result Value    WBC 14.9 (*)     NRBC Automated 0.7 (*)     Seg Neutrophils 76 (*)     Lymphocytes 16 (*)     Segs Absolute 11.35 (*)     All other components within normal limits   COMPREHENSIVE METABOLIC PANEL W/ REFLEX TO MG FOR LOW K - Abnormal; Notable for the following components:    Glucose 151 (*)     BUN 23 (*)     Calcium 10.5 (*)     Sodium 132 (*)     Potassium 3.4 (*)     Chloride 92 (*)     Total Protein 8.7 (*)     All other components within normal limits   LIPASE - Abnormal; Notable for the following components:    Lipase 70 (*)     All other components within normal limits   APTT   PROTIME-INR   LACTIC ACID, WHOLE BLOOD   MAGNESIUM   HEMOGLOBIN AND HEMATOCRIT, BLOOD   HEMOGLOBIN AND HEMATOCRIT, BLOOD   IRON AND TIBC   BASIC METABOLIC PANEL W/ REFLEX TO MG FOR LOW K         RADIOLOGY:  No results found. EKG  EKG Interpretation    Interpreted by mónica Clemens sinus   Axis: normal  Ectopy: none  Conduction: normal  ST Segments: no acute change  T Waves: no acute change  Q Waves: none    Clinical Impression: no acute changes and normal EKG    All EKG's are interpreted by the Emergency Department Physicianwho either signs or Co-signs this chart in the absence of a cardiologist.    EMERGENCY DEPARTMENT COURSE:  ED Course as of Mar 26 0056   Thu Mar 25, 2021   2321 Discussed with Intermed team they are accepting the patient at this time for further workup and management     [MA]      ED Course User Index  [MA] Lima Moseley DO          PROCEDURES:  None    CONSULTS:  IP CONSULT TO HOSPITALIST      FINAL IMPRESSION      1. Gastrointestinal hemorrhage with melena    2. Dehydration          DISPOSITION / PLAN     DISPOSITION Admitted 03/25/2021 11:22:35 PM      PATIENT REFERRED TO:  No follow-up provider specified.     DISCHARGE MEDICATIONS:  New Prescriptions    No medications on file       Lima Moseley DO  Emergency Medicine Resident    (Please note that portions of this note were completed with a voice recognition program.Efforts were made to edit the dictations but occasionally words are mis-transcribed.)       Rivka Keys,   Resident  03/26/21 [id number]

## 2021-03-26 NOTE — ED NOTES
Pt c/o abd pain N&V x4 days. Denies CP SOB states he has been unable to eat for 4 days. Dr. Andre Fontenot at bedside,  Rectal exam performed +hemoccult.      Shayla Alfonso RN  03/25/21 [id number]

## 2021-03-26 NOTE — CARE COORDINATION
Case Management Initial Discharge Plan  Aniya Jalloh,             Met with:patient to discuss discharge plans. Information verified: address, contacts, phone number, , insurance Yes    Emergency Contact/Next of Kin name & number: Yamile Saunders (father)     PCP: No primary care provider on file. Date of last visit: fimaya is looking for a PCP     Insurance Provider: [facility]    Discharge Planning    Living Arrangements:  Family Members   Support Systems:  Family Members    Home has 2 stories  5 with rails,  stairs to climb to get into front door, 1 flight stairs to climb to reach second floor  Location of bedroom/bathroom in home bath on the 1st floor, bedroom on the 2nd floor. Patient able to perform ADL's:Independent    Current Services (outpatient & in home) none  DME equipment: none  DME provider:     Receiving oral anticoagulation therapy? No    If indicated:   Physician managing anticoagulation treatment:   Where does patient obtain lab work for ATC treatment? Potential Assistance Needed:  N/A    Patient agreeable to home care: No  [place] of choice provided:  n/a    Prior SNF/Rehab Placement and Facility:   Agreeable to SNF/Rehab: No  [place] of choice provided: n/a     Evaluation: no    Expected Discharge date:  21    Patient expects to be discharged to:  home  Follow Up Appointment: Best Day/ Time: Monday AM    Transportation provider: family  Transportation arrangements needed for discharge: No, family [facility] transport. Readmission Risk              Risk of Unplanned Readmission:        11             Does patient have a readmission risk score greater than 14?: No  If yes, follow-up appointment must be made within 7 days of discharge. Goals of Care:       Discharge Plan: return to home, no skilled needs identified. Patient states his fiance will schedule a PCP appointment.           Electronically signed by Elizabeth Mahoney RN on 3/26/21 at 6:09 PM EDT

## 2021-03-26 NOTE — FLOWSHEET NOTE
Assessment: Patient is a 38 y.o. male who arrived to ED4 due to \"stomach pain. \" Patient was resting in hospital bed, at time of 's visit. Intervention:  visited patient per initial rounding visits in the ED.  introduced herself to patient and learned about his arrival to the hospital. Patient indicated that he is \"feeling okay,\" and had no needs at time of visit.  encouraged patient to reach out to spiritual care for further support throughout patient's hospitalization. Outcome: Patient was receptive of 's visit and support. Plan: Chaplains can make follow-up visit, per request. Melanie Xiao can be reached 24/7 via [facility]     03/25/21 3568   Encounter Summary   Services provided to: Patient   Referral/Consult From: Rounding  (ED Rounding)   Continue Visiting   (3/25/2021)   Complexity of Encounter Low   Length of Encounter 15 minutes   Routine   Type Initial   Spiritual/Buddhist   Type Spiritual support   Assessment Calm; Approachable;Coping   Intervention Sustaining presence/ Ministry of presence   Outcome Coping

## 2021-03-26 NOTE — ANESTHESIA PRE PROCEDURE
Department of Anesthesiology  Preprocedure Note       Name:  Matt Jeffers   Age:  38 y.o.  :  1980                                          MRN:  [id number]         Date:  3/26/2021      Surgeon: Adonis Sutton):  Rogelio Schrader MD    Procedure: Procedure(s):  EGD ESOPHAGOGASTRODUODENOSCOPY    Medications prior to admission:   Prior to Admission medications    Medication Sig Start Date End Date Taking?  Authorizing Provider   aluminum & magnesium hydroxide-simethicone (MAALOX ADVANCED MAX ST) 400-400-40 MG/5ML SUSP Take 15 mLs by mouth every 6 hours as needed (Heartburn) 20  Yes Andrew Rumps, DO   pantoprazole (PROTONIX) 20 MG tablet Take 2 tablets by mouth 2 times daily 20   Andrew Rumps, DO       Current medications:    Current Facility-Administered Medications   Medication Dose Route Frequency Provider Last Rate Last Admin    0.9 % sodium chloride infusion   Intravenous Continuous Yetta Brianne, APRN -  mL/hr at 21 0609 New Bag at 21 0609    sodium chloride flush 0.9 % injection 10 mL  10 mL Intravenous 2 times per day Yetta Wheatcroft, APRN - CNP        sodium chloride flush 0.9 % injection 10 mL  10 mL Intravenous PRN Yetta Wheatcroft, APRN - CNP        promethazine (PHENERGAN) tablet 12.5 mg  12.5 mg Oral Q6H PRN Yetta Brianne, APRN - CNP        Or    [facility] injection 4 mg  4 mg Intravenous Q6H PRN Yetta Wheatcroft, APRN - CNP        acetaminophen (TYLENOL) tablet 650 mg  650 mg Oral Q6H PRN Yetta Brianne, APRN - CNP        Or    acetaminophen (TYLENOL) suppository 650 mg  650 mg Rectal Q6H PRN Yetta Wheatcroft, APRN - CNP        pantoprazole (PROTONIX) injection 40 mg  40 mg Intravenous Q12H Yetta Brianne, APRN - CNP        And    sodium chloride (PF) 0.9 % injection 10 mL  10 mL Intravenous Q12H Yetta Brianne, APRN - CNP        morphine injection 2 mg  2 mg Intravenous Q4H PRN Yetta Wheatcroft, APRN - CNP        0.9 % sodium chloride infusion   Intravenous Continuous Gigi Solares MD         Current Outpatient Medications   Medication Sig Dispense Refill    aluminum & magnesium hydroxide-simethicone (MAALOX ADVANCED MAX ST) 400-400-40 MG/5ML SUSP Take 15 mLs by mouth every 6 hours as needed (Heartburn) 1 Bottle 0    pantoprazole (PROTONIX) 20 MG tablet Take 2 tablets by mouth 2 times daily 30 tablet 0       Allergies:  No Known Allergies    Problem List:    Patient Active Problem List   Diagnosis Code    GI bleed K92.2    Acute GI bleeding K92.2       Past Medical History:  No past medical history on file. Past Surgical History:  No past surgical history on file. Social History:    Social History     Tobacco Use    Smoking status: Current Every Day Smoker     Packs/day: 0.50     Years: 20.00     Pack years: 10.00     Types: Cigarettes   Substance Use Topics    Alcohol use: Yes     Comment: socially                                 Ready to quit: Not Answered  Counseling given: Not Answered      Vital Signs (Current):   Vitals:    03/26/21 0502 03/26/21 0601 03/26/21 0702 03/26/21 1348   BP: (!) 137/111 (!) 136/104 (!) 124/101 (!) 137/98   Pulse: 92 83 100 70   Resp: 22 27 19 15   Temp:    36.2 °C (97.1 °F)   TempSrc:    Infrared   SpO2: 95% 94% 97% 97%   Weight:       Height:                                                  BP Readings from Last 3 Encounters:   03/26/21 (!) 137/98   12/11/20 (!) 156/109       NPO Status:                                                                                 BMI:   Wt Readings from Last 3 Encounters:   03/25/21 190 lb (86.2 kg)     Body mass index is 29.76 kg/m².     CBC:   Lab Results   Component Value Date    WBC 14.9 03/25/2021    RBC 5.36 03/25/2021    HGB 12.3 03/26/2021    HCT 37.4 03/26/2021    MCV 89.9 03/25/2021    RDW 12.2 03/25/2021     03/25/2021       CMP:   Lab Results   Component Value Date     03/26/2021    K 3.5 03/26/2021    CL 98 03/26/2021    CO2 25 03/26/2021    BUN 19 03/26/2021 CREATININE 0.81 03/26/2021    GFRAA >60 03/26/2021    LABGLOM >60 03/26/2021    GLUCOSE 96 03/26/2021    PROT 8.7 03/25/2021    CALCIUM 9.0 03/26/2021    BILITOT 0.96 03/25/2021    ALKPHOS 54 03/25/2021    AST 16 03/25/2021    ALT 18 03/25/2021       POC Tests: No results for input(s): POCGLU, POCNA, POCK, POCCL, POCBUN, POCHEMO, POCHCT in the last 72 hours. Coags:   Lab Results   Component Value Date    PROTIME 11.5 03/25/2021    INR 1.1 03/25/2021    APTT 24.1 03/25/2021       HCG (If Applicable): No results found for: PREGTESTUR, PREGSERUM, HCG, HCGQUANT     ABGs: No results found for: PHART, PO2ART, FCW6ILQ, YKF6FUS, BEART, W9JONIAE     Type & Screen (If Applicable):  No results found for: LABABO, LABRH    Drug/Infectious Status (If Applicable):  No results found for: HIV, HEPCAB    COVID-19 Screening (If Applicable):   Lab Results   Component Value Date    COVID19 Not Detected 03/26/2021           Anesthesia Evaluation  Patient summary reviewed and Nursing notes reviewed  Airway: Mallampati: III  TM distance: >3 FB   Neck ROM: full  Mouth opening: > = 3 FB Dental:          Pulmonary: breath sounds clear to auscultation  (+) current smoker                           Cardiovascular:Negative CV ROS  Exercise tolerance: good (>4 METS),         ECG reviewed  Rhythm: regular  Rate: normal                    Neuro/Psych:   Negative Neuro/Psych ROS              GI/Hepatic/Renal: Neg GI/Hepatic/Renal ROS            Endo/Other: Negative Endo/Other ROS                    Abdominal:   (+) obese,         Vascular: negative vascular ROS. Anesthesia Plan      MAC     ASA 2       Induction: intravenous. Anesthetic plan and risks discussed with patient. Plan discussed with attending.                   Bernardo Ritchie, APRN - CRNA   3/26/2021

## 2021-03-26 NOTE — H&P
[facility]  Office: [address], DO, Robert Bai, DO, Niurka Godfrey, DO, Isaura Espinal, DO, Valarie Navarro MD, Masha Aly MD, Nicole Leon MD, Ivonne Wade MD, Pepe Hyatt MD, Micheal Trimble MD, Ute Mendieta MD, Drea Calderon MD, Katty Norwood DO, Val Banks MD, Ayah Wakefield DO, Luis Miguel Wade MD,  Siddharth Bowens DO, Rocio Jamison MD, Carlo Copeland MD, Cheyenne Price MD, Jia Dallas MD, Alexandre Rios, [facility], [facility], CNP, Marsha Slater, CNP, Fernando Smalls, CNS, Sunshine Gama, CNP, Annia Oneal, CNP, Kadeem Gonzalez, CNP, Carley Brown, CNP, Jesus Delaney, CNP, Heike Linares PA-C, Brice Bermudez, [facility], Emanuel Thornton, CNP, Jazmin Canada, CNP, Stella Nix, CNP, Nemo Roberts, CNP, Cash Brink, CNP, Néstor Newberry, [facility] [address]    HISTORY AND PHYSICAL EXAMINATION            Date:   3/26/2021  Patient name:  Maryann Avery  Date of admission:  3/25/2021  9:55 PM  MRN:   [id number]  Account:  [de-identified]  YOB: 1980  PCP:    No primary care provider on file. Room:   [address] [place] [address]  Code Status:    Full Code    Chief Complaint:     Chief Complaint   Patient presents with    Abdominal Pain    Nausea    Emesis       History Obtained From:     patient, electronic medical record    History of Present Illness:     Maryann Avery is a 38 y.o. Non-/non  male who presents with Abdominal Pain, Nausea, and Emesis   and is admitted to the hospital for the management of Acute GI bleeding. 79-year-old male no significant past medical history presents with nausea vomiting abdominal pain that have been persisting for the past few days. Patient states that he has also been feeling constipated as well. Patient admits to drinking coffee in the morning as well as a red bull in the afternoon.   Does drink multiple caffeinated drinks throughout the day as well as carbonated drinks in the afternoon. Does not follow-up with a PCP but understands that he will need to. Patient noted to have 4 unit drop of hemoglobin with no acute blood loss. CT scan showing:  Negative CT examination with no evidence of acute process within the abdomen   or pelvis. Past Medical History:     No past medical history on file. Past Surgical History:     No past surgical history on file. Medications Prior to Admission:     Prior to Admission medications    Medication Sig Start Date End Date Taking? Authorizing Provider   aluminum & magnesium hydroxide-simethicone (MAALOX ADVANCED MAX ST) 400-400-40 MG/5ML SUSP Take 15 mLs by mouth every 6 hours as needed (Heartburn) 12/11/20  Yes Earnie Tensas, DO   pantoprazole (PROTONIX) 20 MG tablet Take 2 tablets by mouth 2 times daily 12/11/20   Earnie Tensas, DO        Allergies:     Patient has no known allergies. Social History:     Tobacco:    reports that he has been smoking cigarettes. He has a 10.00 pack-year smoking history. He does not have any smokeless tobacco history on file. Alcohol:      reports current alcohol use. Drug Use:  reports current drug use. Drug: Marijuana. Family History:     No family history on file. Patient denies any cardiac history in parents  Review of Systems:     Positive and Negative as described in HPI.     CONSTITUTIONAL:  negative for fevers, chills, sweats, fatigue, weight loss  HEENT:  negative for vision, hearing changes, runny nose, throat pain  RESPIRATORY:  negative for shortness of breath, cough, congestion, wheezing  CARDIOVASCULAR:  negative for chest pain, palpitations  GASTROINTESTINAL:  negative for nausea, vomiting, diarrhea, constipation, change in bowel habits, abdominal pain   GENITOURINARY:  negative for difficulty of urination, burning with urination, frequency   INTEGUMENT:  negative for rash, skin lesions, easy bruising   HEMATOLOGIC/LYMPHATIC:  negative for swelling/edema ALLERGIC/IMMUNOLOGIC:  negative for urticaria , itching  ENDOCRINE:  negative increase in drinking, increase in urination, hot or cold intolerance  MUSCULOSKELETAL:  negative joint pains, muscle aches, swelling of joints  NEUROLOGICAL:  negative for headaches, dizziness, lightheadedness, numbness, pain, tingling extremities  BEHAVIOR/PSYCH:  negative for depression, anxiety    Physical Exam:   BP (!) 135/106   Pulse 80   Temp 96.4 °F (35.8 °C)   Resp 18   Ht 5' 7\" (1.702 m)   Wt 190 lb (86.2 kg)   SpO2 100%   BMI 29.76 kg/m²   Temp (24hrs), Av.8 °F (36 °C), Min:96.4 °F (35.8 °C), Max:97.1 °F (36.2 °C)    No results for input(s): POCGLU in the last 72 hours.     Intake/Output Summary (Last 24 hours) at 3/26/2021 1614  Last data filed at 3/26/2021 1419  Gross per 24 hour   Intake 200 ml   Output --   Net 200 ml       General Appearance: alert, well appearing, and in mild abdominal distress  Mental status: oriented to person, place, and time  Head: normocephalic, atraumatic  Eye: no icterus, redness, pupils equal and reactive, extraocular eye movements intact, conjunctiva clear  Ear: normal external ear, no discharge, hearing intact  Nose: no drainage noted  Mouth: mucous membranes moist  Neck: supple, no carotid bruits, thyroid not palpable  Lungs: Bilateral equal air entry, clear to ausculation, no wheezing, rales or rhonchi, normal effort  Cardiovascular: normal rate, regular rhythm, no murmur, gallop, rub  Abdomen: Soft, mild tenderness around the epigastrium, bowel sounds present  Neurologic: There are no new focal motor or sensory deficits, normal muscle tone and bulk, no abnormal sensation, normal speech, cranial nerves II through XII grossly intact  Skin: No gross lesions, rashes, bruising or bleeding on exposed skin area  Extremities: peripheral pulses palpable, no pedal edema or calf pain with palpation  Psych: normal affect    Investigations:      Laboratory Testing:  Recent Results (from the Detected Not Detected   Hemoglobin and Hematocrit, Blood    Collection Time: 03/26/21 12:18 PM   Result Value Ref Range    Hemoglobin 12.3 (L) 13.0 - 17.0 g/dL    Hematocrit 37.4 (L) 40.7 - 50.3 %       Imaging/Diagnostics:  Ct Abdomen Pelvis W Iv Contrast Additional Contrast? None    Result Date: 3/26/2021  Negative CT examination with no evidence of acute process within the abdomen or pelvis. Assessment :      Hospital Problems           Last Modified POA    * (Principal) Acute GI bleeding 3/26/2021 Yes    GI bleed 3/25/2021 Yes    Duodenal ulcer 3/26/2021 Yes    Overweight (BMI 25.0-29.9) 3/26/2021 Yes    Caffeine adverse reaction 3/26/2021 Yes    Smoker 3/26/2021 Yes          Plan:     Patient status inpatient in the Med/Surge    1. Acute GI bleed as well as concern for gastritis. Appreciate GI recommendations. Plan for EGD today. Protonix IV in the interim  2. Poor sleep hygiene with excessive caffeine use. Discussed ways to decrease caffeine use as well as to limit caffeine in the evenings  3. Encourage smoking cessation  4. Patient needs PCP    Consultations:   IP CONSULT TO HOSPITALIST  IP CONSULT TO GI    Patient is admitted as inpatient status because of co-morbidities listed above, severity of signs and symptoms as outlined, requirement for current medical therapies and most importantly because of direct risk to patient if care not provided in a hospital setting. Expected length of stay > 48 hours.     Ashley Rodriguez MD  3/26/2021  4:14 PM

## 2021-03-26 NOTE — CONSULTS
[facility]    GASTROENTEROLOGY CONSULT    Patient:   Dajuan Rosario   :    1980   Facility:   [facility]   Date:    3/26/2021  Admission Dx:  GI bleed [K92.2]  Requesting physician: Stu Mercedes MD  Reason for consult:  : Melanotic stool   CC : Nausea, vomiting, abdominal pain    SUBJECTIVE     HISTORY OF PRESENT ILLNESS  This is a 38 y.o.  male who was admitted 3/25/2021 with GI bleed [K92.2]. We have been asked to see the patient in consultation by Stu Mercedes MD for melanotic stool. 65-year-old male with no significant past medical history presented to the ED with reports of 4-day history of nausea, vomiting, and abdominal pain. Patient reports symptoms started  and progressively was unable to tolerate any p.o. intake and presented for further evaluation. Patient has been experiencing abdominal pain described as diffuse, constant sharp, crampy pain, worse over left lower quadrant. He had associated nausea and vomiting. He describes one episode of coffee-ground emesis and one source of a speck of blood. Last episode of emesis was last night prior to admission. He reports 1 episode of \"blackish\" bowel movement that occurred 2 to 3 days ago. No history of bright red blood. He reports a 15-year history of reflux symptoms. Patient tried Mylanta without improvement of symptoms. Symptoms aggravated by p.o. intake. Denies any dysphagia or odynophagia. Apically appetite is good with no unintentional weight loss. Patient was seen in the ED in 2020 with similar complaints. Patient reported at that time he believed he had stomach ulcers as he read about the symptoms online. Patient was prescribed Protonix 20 mg twice daily and advised GI follow-up however,  patient did not take medications or schedule appointment. He reports smoking 1/2 pack/day for the past 21 years.   Consumes alcohol on weekends reported as 1 pint of tequrinku. Positive marijuana use. No history of IV drug use. No family history of GI diseases including esophageal, pancreatic, liver, or colon cancer. He has been taking Motrin 400 mg every other day for tooth ache for several months. No prior EGD or colonoscopy    Globin on admission was 16.5 g/dL. Patient did receive 1 L fluid bolus with current IV fluids at 150 mL/h. Repeat hemoglobin 12.8 g/dL        Previous GI history:   No prior EGD or colonoscopy. OBJECTIVE:     PAST MEDICAL/SURGICAL HISTORY  No past medical history - per patient  No past surgical history -Per patient    ALLERGIES:  No Known Allergies    HOME MEDICATIONS:  Prior to Admission medications    Medication Sig Start Date End Date Taking? Authorizing Provider   pantoprazole (PROTONIX) 20 MG tablet Take 2 tablets by mouth 2 times daily 12/11/20   Uri Lopez DO   aluminum & magnesium hydroxide-simethicone (MAALOX ADVANCED MAX ST) 400-400-40 MG/5ML SUSP Take 15 mLs by mouth every 6 hours as needed (Heartburn) 12/11/20   Uri Lopez DO       CURRENT MEDICATIONS:  Scheduled Meds:   sodium chloride flush  10 mL Intravenous 2 times per day    pantoprazole  40 mg Intravenous Q12H    And    sodium chloride (PF)  10 mL Intravenous Q12H     Continuous Infusions:   sodium chloride 150 mL/hr at 03/26/21 0609     PRN Meds:sodium chloride flush, promethazine **OR** ondansetron, acetaminophen **OR** acetaminophen, morphine    SOCIAL HISTORY:     Tobacco:   reports that he has been smoking cigarettes. He has a 10.00 pack-year smoking history. He does not have any smokeless tobacco history on file. Alcohol:   reports current alcohol use. Illicit drugs:  reports current drug use. Drug: Marijuana. FAMILY HISTORY:     No family history on file. REVIEW OF SYSTEMS:    Constitutional: No fever, no chills, no lethargy, no weakness.   HEENT:  No headache, otalgia, itchy eyes, nasal discharge or sore [facility] 239       IMAGING  No results found. IMPRESSION:     60-year-old male with no significant past medical history presented to the ED with reports of 4-day history of nausea, vomiting with coffee-ground hematemesis,  diffuse abdominal pain and one episode of melena. 1. N/V with coffee-ground hematemesis -hemoglobin declined, likely due to IV fluids/rehydration  2. Diffuse abdominal pain  3. Melena x 1  4. Chronic heartburn    DDX -esophagitis, erosive esophagitis with ulcers, gastritis, duodenitis, AVMs/Lesion    Old records, labs and imaging reviewed. PLAN   1. We can plan for diagnostic EGD today. Patient will need COVID testing prior  2. Continue NPO  3. PPI 40 mg IV twice daily   4. Monitor serial hemoglobin and hematocrit. 5.  Further plans to follow EGD  6. Follow-up on CT A/P     This plan was formulated in collaboration with Dr. Monica Tilley   Thank you for allowing us to participate in the care of your patient. Electronically signed by: Mell MARKS on 3/26/2021 at 7:54 AM     Please note that this note was generated using a voice recognition dictation software. Although every effort was made to ensure the accuracy of this automated transcription, some errors in transcription may have occurred.

## 2021-03-27 PROBLEM — D62 ACUTE BLOOD LOSS ANEMIA: Status: ACTIVE | Noted: 2021-03-27

## 2021-03-27 PROBLEM — R74.8 ELEVATED LIPASE: Status: ACTIVE | Noted: 2021-03-27

## 2021-03-27 PROBLEM — E87.6 HYPOKALEMIA: Status: ACTIVE | Noted: 2021-03-27

## 2021-03-27 PROBLEM — E87.1 HYPONATREMIA: Status: ACTIVE | Noted: 2021-03-27

## 2021-03-27 LAB
HCT VFR BLD CALC: 32.1 % (ref 40.7–50.3)
HCT VFR BLD CALC: 35 % (ref 40.7–50.3)
HEMOGLOBIN: 10.5 G/DL (ref 13–17)
HEMOGLOBIN: 11 G/DL (ref 13–17)

## 2021-03-27 PROCEDURE — 85014 HEMATOCRIT: CPT

## 2021-03-27 PROCEDURE — 99232 SBSQ HOSP IP/OBS MODERATE 35: CPT | Performed by: INTERNAL MEDICINE

## 2021-03-27 PROCEDURE — 6360000002 HC RX W HCPCS: Performed by: NURSE PRACTITIONER

## 2021-03-27 PROCEDURE — C9113 INJ PANTOPRAZOLE SODIUM, VIA: HCPCS | Performed by: INTERNAL MEDICINE

## 2021-03-27 PROCEDURE — 2580000003 HC RX 258: Performed by: INTERNAL MEDICINE

## 2021-03-27 PROCEDURE — 36415 COLL VENOUS BLD VENIPUNCTURE: CPT

## 2021-03-27 PROCEDURE — 1200000000 HC SEMI PRIVATE

## 2021-03-27 PROCEDURE — 2580000003 HC RX 258: Performed by: NURSE PRACTITIONER

## 2021-03-27 PROCEDURE — 85018 HEMOGLOBIN: CPT

## 2021-03-27 PROCEDURE — 6360000002 HC RX W HCPCS: Performed by: INTERNAL MEDICINE

## 2021-03-27 PROCEDURE — C9113 INJ PANTOPRAZOLE SODIUM, VIA: HCPCS | Performed by: NURSE PRACTITIONER

## 2021-03-27 RX ORDER — PANTOPRAZOLE SODIUM 40 MG/1
40 TABLET, DELAYED RELEASE ORAL
Status: DISCONTINUED | OUTPATIENT
Start: 2021-03-27 | End: 2021-03-28 | Stop reason: HOSPADM

## 2021-03-27 RX ORDER — SODIUM CHLORIDE 9 MG/ML
10 INJECTION INTRAVENOUS DAILY
Status: DISCONTINUED | OUTPATIENT
Start: 2021-03-29 | End: 2021-03-28 | Stop reason: HOSPADM

## 2021-03-27 RX ORDER — PANTOPRAZOLE SODIUM 40 MG/10ML
40 INJECTION, POWDER, LYOPHILIZED, FOR SOLUTION INTRAVENOUS ONCE
Status: COMPLETED | OUTPATIENT
Start: 2021-03-27 | End: 2021-03-27

## 2021-03-27 RX ADMIN — SODIUM CHLORIDE: 9 INJECTION, SOLUTION INTRAVENOUS at 19:53

## 2021-03-27 RX ADMIN — MORPHINE SULFATE 2 MG: 4 INJECTION INTRAVENOUS at 05:47

## 2021-03-27 RX ADMIN — MORPHINE SULFATE 2 MG: 4 INJECTION INTRAVENOUS at 00:51

## 2021-03-27 RX ADMIN — MORPHINE SULFATE 2 MG: 4 INJECTION INTRAVENOUS at 18:49

## 2021-03-27 RX ADMIN — MORPHINE SULFATE 2 MG: 4 INJECTION INTRAVENOUS at 10:37

## 2021-03-27 RX ADMIN — SODIUM CHLORIDE: 9 INJECTION, SOLUTION INTRAVENOUS at 05:54

## 2021-03-27 RX ADMIN — PANTOPRAZOLE SODIUM 40 MG: 40 INJECTION, POWDER, FOR SOLUTION INTRAVENOUS at 17:23

## 2021-03-27 RX ADMIN — MORPHINE SULFATE 2 MG: 4 INJECTION INTRAVENOUS at 15:00

## 2021-03-27 RX ADMIN — MORPHINE SULFATE 2 MG: 4 INJECTION INTRAVENOUS at 23:14

## 2021-03-27 RX ADMIN — PANTOPRAZOLE SODIUM 8 MG/HR: 40 INJECTION, POWDER, FOR SOLUTION INTRAVENOUS at 00:56

## 2021-03-27 ASSESSMENT — PAIN SCALES - GENERAL
PAINLEVEL_OUTOF10: 5
PAINLEVEL_OUTOF10: 4
PAINLEVEL_OUTOF10: 2
PAINLEVEL_OUTOF10: 2
PAINLEVEL_OUTOF10: 4

## 2021-03-27 ASSESSMENT — ENCOUNTER SYMPTOMS
COUGH: 0
SHORTNESS OF BREATH: 0
NAUSEA: 1
BLOOD IN STOOL: 0
VOMITING: 0
ABDOMINAL PAIN: 1

## 2021-03-27 ASSESSMENT — PAIN DESCRIPTION - PAIN TYPE: TYPE: ACUTE PAIN

## 2021-03-27 NOTE — PROGRESS NOTES
ALT 18   AST 16   BILITOT 0.96   LABALBU 4.7       Amylase/Lipase and Ammonia:  Recent Labs     03/25/21  2221   LIPASE 70*       Acute Hepatitis Panel:  No results found for: HEPBSAG, HEPCAB, HEPBIGM, HEPAIGM    HCV Genotype:  No results found for: HEPATITISCGENOTYPE    HCV Quantitative:  No results found for: HCVQNT    LIVER WORK UP:    AFP  No results found for: AFP    Alpha 1 antitrypsin   No results found for: A1A    MAXIMINO  No results found for: MAXIMINO    AMA  No results found for: MITOAB    ASMA  No results found for: SMOOTHMUSCAB    PT/INR  Recent Labs     03/25/21  2221   PROTIME 11.5   INR 1.1       Cancer Markers:  CEA:  No results for input(s): CEA in the last 72 hours. Ca 125:  No results for input(s):  in the last 72 hours. Ca 19-9:   Invalid input(s):   AFP: No results for input(s): AFP in the last 72 hours. Lactic acid:Invalid input(s): LACTIC ACID    Radiology Review:    No results found. Principal Problem:    Acute GI bleeding  Active Problems:    GI bleed    Duodenal ulcer    Overweight (BMI 25.0-29. 9)    Caffeine adverse reaction    Smoker  Resolved Problems:    * No resolved hospital problems. *       GI Impression:    1. Hematemesis-resolved. 2. Usdtnq-ajgsllch-jfiydg post 3/26/2021 EGD that revealed mild antral gastritis and large duodenal bulb ulceration with pigmented spots. Active bleeding. No intervention or treatment required. Globin is stable    Plan and Recommendations:    1. We will DC drip and change to PPI IV x1 tonight, then 40 mg p.o. twice daily starting tomorrow  2. Full liquid diet  3. Monitor for active bleeding  4. CBC in a.m.  5. Anticipate discharge tomorrow if no overt bleeding is noted and hemoglobin is stable      This plan was formulated in collaboration with Dr. Rosalina Valdez MD    Thank you for allowing me to participate in the care of your patient. Please feel free to contact me with any questions or concerns.      Nhan Aviles, APRN - CNP   [facility]      Attending Physican Attestation  Patient seen and examined with Ms. Tremaine Greene CNP.  I have reviewed the above note and confirmed the key elements of the medical history and physical exam. I have discussed the findings, established the care plan and recommendations with Ms. Adwoa Marmolejo and primary team.    Dayday Montero MD  Gastroenterology

## 2021-03-27 NOTE — PROGRESS NOTES
[facility]    Office: [address], DO, Deo Gracia, DO, Amanda Rea, DO, Kannan Covert Blood, DO, Lashawn Luke MD, John Arteaga MD, Tushar García MD, Leonard Khalil MD, Collette Denmark, MD, Tony Pinto MD, Payton Carmichael MD, Pedro Crowley MD, Rowan Molina MD, Cristina Allen, DO, Jeana Mosley MD, Alma Delia Paredes MD, Brianna Saldana DO, Ermelinda Norton MD,  Lucy Kauffman DO, Gabby Wilkerson MD, Eliott Spatz, MD, Ramona Domingo, [facility], [address], [facility], Darrion Head, CNP, Nayeli Boston, CNS, Vernon Porter, CNP, Dee Frias, CNP, Esperanza Romero, CNP, Prachi Krause, CNP, Shanell Monique, CNP, Robert Velasquez PA-C, Mone Cardona, WALKER, Duane Abed, CNP, Jennifer Ku, CNP, Jammie Lopez, CNP, Harvey Turcios, CNP, Abilio Reza, [facility]   [facility]    Progress Note    3/27/2021    3:16 PM    Name:   Je Mayberry  MRN:     [id number]     Acct:      [de-identified]   Room:   [address] [place] [address] Day:  1  Admit Date:  3/25/2021  9:55 PM    PCP:   No primary care provider on file. Code Status:  Full Code    Subjective:     C/C:   Chief Complaint   Patient presents with    Abdominal Pain    Nausea    Emesis     Interval History Status:  Improved  Abd pain resolving  Hungry   No nausea or vomiting   Small BM last night  No melena or hematochezia    Data Base Updates:  Cdywlpjcfm80.0Low g/dL Jkxrfapymw79. 0Low     Specimen Source: Blood Specimen Description. BLOOD Special [id number] L HAND CultureNO GROWTH 1 DAY     Brief History:     As documented in the medical record:  65-year-old male no significant past medical history presents with nausea vomiting abdominal pain that have been persisting for the past few days. Patient states that he has also been feeling constipated as well. Patient admits to drinking coffee in the morning as well as a red bull in the afternoon.   Does drink multiple caffeinated drinks throughout the day as cigarettes. He has a 10.00 pack-year smoking history. He does not have any smokeless tobacco history on file. He reports current alcohol use. He reports current drug use. Drug: Marijuana. Family History: No family history on file. Review of Systems:     Review of Systems   Constitutional: Positive for appetite change (Improving). Respiratory: Negative for cough and shortness of breath. Cardiovascular: Negative for chest pain and palpitations. Gastrointestinal: Positive for abdominal pain (Minimal residual left upper quadrant pain) and nausea (Resolved). Negative for blood in stool and vomiting. Genitourinary: Negative for flank pain and hematuria. Physical Examination:        Physical Exam  Vitals signs reviewed. Constitutional:       General: He is not in acute distress. Appearance: He is not diaphoretic. HENT:      Head: Normocephalic. Nose: Nose normal.   Eyes:      General: No scleral icterus. Conjunctiva/sclera: Conjunctivae normal.   Neck:      Musculoskeletal: Neck supple. Trachea: No tracheal deviation. Cardiovascular:      Rate and Rhythm: Normal rate and regular rhythm. Pulmonary:      Effort: Pulmonary effort is normal. No respiratory distress. Breath sounds: Normal breath sounds. No wheezing or rales. Chest:      Chest wall: No tenderness. Abdominal:      General: Bowel sounds are normal. There is no distension. Palpations: Abdomen is soft. Tenderness: There is no abdominal tenderness. There is no guarding or rebound. Musculoskeletal:         General: No tenderness. Skin:     General: Skin is warm and dry. Neurological:      Mental Status: He is alert and oriented to person, place, and time.        Vitals:  /67   Pulse 80   Temp 98.3 °F (36.8 °C) (Oral)   Resp 18   Ht 5' 7\" (1.702 m)   Wt 197 lb 5 oz (89.5 kg)   SpO2 99%   BMI 30.90 kg/m²   Temp (24hrs), Av °F (36.7 °C), Min:97.6 °F (36.4 °C), Max:98.3 °F (36.8 °C)    No results for input(s): POCGLU in the last 72 hours. I/O (24Hr): No intake or output data in the 24 hours ending 03/27/21 1516    Labs:  Hematology:  Recent Labs     03/25/21 2221 03/25/21 2221 03/26/21  1743 03/26/21  2334 03/27/21  0527   WBC 14.9*  --   --   --   --    RBC 5.36  --   --   --   --    HGB 16.5   < > 11.6* 11.1* 11.0*   HCT 48.2   < > 35.7* 35.0* 35.0*   MCV 89.9  --   --   --   --    MCH 30.8  --   --   --   --    MCHC 34.2  --   --   --   --    RDW 12.2  --   --   --   --      --   --   --   --    MPV 10.8  --   --   --   --    INR 1.1  --   --   --   --     < > = values in this interval not displayed. Chemistry:  Recent Labs     03/25/21 2221 03/26/21  0453   * 133*   K 3.4* 3.5*   CL 92* 98   CO2 26 25   GLUCOSE 151* 96   BUN 23* 19   CREATININE 1.02 0.81   MG 2.3 2.1   ANIONGAP 14 10   LABGLOM >60 >60   GFRAA >60 >60   CALCIUM 10.5* 9.0   LACTACIDWB 2.1  --      Recent Labs     03/25/21 2221   PROT 8.7*   LABALBU 4.7   AST 16   ALT 18   ALKPHOS 54   BILITOT 0.96   LIPASE 70*     ABG:No results found for: POCPH, PHART, PH, POCPCO2, RRM4GEN, PCO2, POCPO2, PO2ART, PO2, POCHCO3, HTT2ZGC, HCO3, NBEA, PBEA, BEART, BE, THGBART, THB, DXR2MYY, BVMP0KVB, D2UCSREC, O2SAT, FIO2  Lab Results   Component Value Date/Time    SPECIAL R ARM 10ML 03/26/2021 05:43 PM     Lab Results   Component Value Date/Time    CULTURE NO GROWTH 20 HOURS 03/26/2021 05:43 PM       Radiology:  Ct Abdomen Pelvis W Iv Contrast Additional Contrast? None    Result Date: 3/26/2021  Negative CT examination with no evidence of acute process within the abdomen or pelvis. Assessment:        Principal Problem:    Duodenal ulcer  Active Problems:    GI bleed    Acute GI bleeding    Overweight (BMI 25.0-29. 9)    Caffeine adverse reaction    Smoker    Hypokalemia    Hyponatremia    Acute blood loss anemia    Elevated lipase  Resolved Problems:    * No resolved hospital problems.  *      Plan:        GI evaluation and follow-up  Diet initiated  Blood products as needed  PPI  Smoking cessation  Caffeine abstinence  Correct electrolyte abnormalities  Recheck amylase and lipase     IP CONSULT TO HOSPITALIST  IP CONSULT TO SHERIF Quiñones DO  3/27/2021  3:16 PM

## 2021-03-28 VITALS
HEART RATE: 64 BPM | OXYGEN SATURATION: 100 % | HEIGHT: 67 IN | DIASTOLIC BLOOD PRESSURE: 67 MMHG | BODY MASS INDEX: 30.72 KG/M2 | RESPIRATION RATE: 14 BRPM | SYSTOLIC BLOOD PRESSURE: 144 MMHG | TEMPERATURE: 98.7 F | WEIGHT: 195.7 LBS

## 2021-03-28 PROBLEM — K85.90 ACUTE PANCREATITIS: Status: ACTIVE | Noted: 2021-03-28

## 2021-03-28 PROBLEM — E83.51 HYPOCALCEMIA: Status: ACTIVE | Noted: 2021-03-28

## 2021-03-28 LAB
AMYLASE: 132 U/L (ref 28–100)
ANION GAP SERPL CALCULATED.3IONS-SCNC: 7 MMOL/L (ref 9–17)
BUN BLDV-MCNC: 7 MG/DL (ref 6–20)
BUN/CREAT BLD: ABNORMAL (ref 9–20)
CALCIUM SERPL-MCNC: 8.2 MG/DL (ref 8.6–10.4)
CHLORIDE BLD-SCNC: 109 MMOL/L (ref 98–107)
CO2: 23 MMOL/L (ref 20–31)
CREAT SERPL-MCNC: 0.74 MG/DL (ref 0.7–1.2)
GFR AFRICAN AMERICAN: >60 ML/MIN
GFR NON-AFRICAN AMERICAN: >60 ML/MIN
GFR SERPL CREATININE-BSD FRML MDRD: ABNORMAL ML/MIN/{1.73_M2}
GFR SERPL CREATININE-BSD FRML MDRD: ABNORMAL ML/MIN/{1.73_M2}
GLUCOSE BLD-MCNC: 89 MG/DL (ref 70–99)
HCT VFR BLD CALC: 32.5 % (ref 40.7–50.3)
HEMOGLOBIN: 10.2 G/DL (ref 13–17)
LIPASE: 74 U/L (ref 13–60)
POTASSIUM SERPL-SCNC: 4 MMOL/L (ref 3.7–5.3)
SODIUM BLD-SCNC: 139 MMOL/L (ref 135–144)

## 2021-03-28 PROCEDURE — 2580000003 HC RX 258: Performed by: NURSE PRACTITIONER

## 2021-03-28 PROCEDURE — 83690 ASSAY OF LIPASE: CPT

## 2021-03-28 PROCEDURE — 99232 SBSQ HOSP IP/OBS MODERATE 35: CPT | Performed by: INTERNAL MEDICINE

## 2021-03-28 PROCEDURE — 99238 HOSP IP/OBS DSCHRG MGMT 30/<: CPT | Performed by: INTERNAL MEDICINE

## 2021-03-28 PROCEDURE — 85018 HEMOGLOBIN: CPT

## 2021-03-28 PROCEDURE — 6360000002 HC RX W HCPCS: Performed by: NURSE PRACTITIONER

## 2021-03-28 PROCEDURE — 6370000000 HC RX 637 (ALT 250 FOR IP): Performed by: NURSE PRACTITIONER

## 2021-03-28 PROCEDURE — 80048 BASIC METABOLIC PNL TOTAL CA: CPT

## 2021-03-28 PROCEDURE — 36415 COLL VENOUS BLD VENIPUNCTURE: CPT

## 2021-03-28 PROCEDURE — 85014 HEMATOCRIT: CPT

## 2021-03-28 PROCEDURE — 82150 ASSAY OF AMYLASE: CPT

## 2021-03-28 PROCEDURE — 6370000000 HC RX 637 (ALT 250 FOR IP): Performed by: INTERNAL MEDICINE

## 2021-03-28 RX ORDER — PROMETHAZINE HYDROCHLORIDE 12.5 MG/1
12.5 TABLET ORAL EVERY 6 HOURS PRN
Qty: 20 TABLET | Refills: 1 | Status: SHIPPED | OUTPATIENT
Start: 2021-03-28 | End: 2021-04-04

## 2021-03-28 RX ORDER — MAGNESIUM HYDROXIDE/ALUMINUM HYDROXICE/SIMETHICONE 120; 1200; 1200 MG/30ML; MG/30ML; MG/30ML
15 SUSPENSION ORAL EVERY 6 HOURS PRN
Status: DISCONTINUED | OUTPATIENT
Start: 2021-03-28 | End: 2021-03-28 | Stop reason: HOSPADM

## 2021-03-28 RX ORDER — PANTOPRAZOLE SODIUM 40 MG/1
40 TABLET, DELAYED RELEASE ORAL
Qty: 60 TABLET | Refills: 3 | Status: SHIPPED | OUTPATIENT
Start: 2021-03-28 | End: 2021-07-27 | Stop reason: ALTCHOICE

## 2021-03-28 RX ORDER — CALCIUM CARBONATE 200(500)MG
1000 TABLET,CHEWABLE ORAL ONCE
Status: COMPLETED | OUTPATIENT
Start: 2021-03-28 | End: 2021-03-28

## 2021-03-28 RX ADMIN — SODIUM CHLORIDE: 9 INJECTION, SOLUTION INTRAVENOUS at 02:55

## 2021-03-28 RX ADMIN — ANTACID TABLETS 1000 MG: 500 TABLET, CHEWABLE ORAL at 09:18

## 2021-03-28 RX ADMIN — SODIUM CHLORIDE: 9 INJECTION, SOLUTION INTRAVENOUS at 09:19

## 2021-03-28 RX ADMIN — PANTOPRAZOLE SODIUM 40 MG: 40 TABLET, DELAYED RELEASE ORAL at 06:03

## 2021-03-28 RX ADMIN — MORPHINE SULFATE 2 MG: 4 INJECTION INTRAVENOUS at 10:47

## 2021-03-28 ASSESSMENT — ENCOUNTER SYMPTOMS
VOMITING: 0
COUGH: 0
ABDOMINAL PAIN: 1
SHORTNESS OF BREATH: 0
BLOOD IN STOOL: 0
NAUSEA: 0

## 2021-03-28 ASSESSMENT — PAIN SCALES - GENERAL: PAINLEVEL_OUTOF10: 6

## 2021-03-28 NOTE — PROGRESS NOTES
CLINICAL PHARMACY NOTE: MEDS TO [address] Select Patient?: No  Total # of Prescriptions Filled: 2   The following medications were delivered to the patient:  · Pantoprazole 40mg  · Promethazine 12.5mg  Total # of Interventions Completed: 0  Time Spent (min): 15    Additional Documentation:

## 2021-03-28 NOTE — PROGRESS NOTES
[facility] [place] [facility]   Progress Note    Miguel Haile is a 38 y.o. male patient. Hospitalization Day:2      Chief consult reason:   Hematemesis   Melena    Subjective:  Patient seen and examined. Patient tolerated full liquid diet without any issues, hemoglobin is stable 10.2 this a.m., patient denies any active bleeding overnight, complains of minimal periumbilical pain    VITALS:  BP (!) 130/106   Pulse 74   Temp 99.1 °F (37.3 °C) (Oral)   Resp 16   Ht 5' 7\" (1.702 m)   Wt 195 lb 11.2 oz (88.8 kg)   SpO2 100%   BMI 30.65 kg/m²   TEMPERATURE:  Current - Temp: 99.1 °F (37.3 °C); Max - Temp  Av.3 °F (36.8 °C)  Min: 97.8 °F (36.6 °C)  Max: 99.1 °F (37.3 °C)    Physical Assessment:  General appearance:  alert, cooperative and no distress  Mental Status:  oriented to person, place and time and normal affect  Lungs:  clear to auscultation bilaterally, normal effort  Heart:  regular rate and rhythm, no murmur  Abdomen:  soft, nontender, nondistended, normal bowel sounds, no masses, hepatomegaly, splenomegaly  Extremities:  no edema, redness, tenderness in the calves  Skin:  no gross lesions, rashes, induration    Data Review:    Labs and Imaging:     CBC:  Recent Labs     21  1743 21  2334 21  0527 21  0546   WBC 14.9*  --   --   --   --   --   --    HGB 16.5   < > 11.6* 11.1* 11.0* 10.5* 10.2*   MCV 89.9  --   --   --   --   --   --    RDW 12.2  --   --   --   --   --   --      --   --   --   --   --   --     < > = values in this interval not displayed.        ANEMIA STUDIES:  Recent Labs     21   LABIRON 27   TIBC 329       BMP:  Recent Labs     21  0546   * 133* 139   K 3.4* 3.5* 4.0   CL 92* 98 109*   CO2 26 25 23   BUN 23* 19 7   CREATININE 1.02 0.81 0.74   GLUCOSE 151* 96 89   CALCIUM 10.5* 9.0 8.2*       LFTS:  Recent Labs     21  2221   ALKPHOS 54   ALT 18 AST 16   BILITOT 0.96   LABALBU 4.7       Amylase/Lipase and Ammonia:  Recent Labs     03/25/21 2221 03/28/21  0546   AMYLASE  --  132*   LIPASE 70* 74*       Acute Hepatitis Panel:  No results found for: HEPBSAG, HEPCAB, HEPBIGM, HEPAIGM    HCV Genotype:  No results found for: HEPATITISCGENOTYPE    HCV Quantitative:  No results found for: HCVQNT    LIVER WORK UP:    AFP  No results found for: AFP    Alpha 1 antitrypsin   No results found for: A1A    MAXIMINO  No results found for: MAXIMINO    AMA  No results found for: MITOAB    ASMA  No results found for: SMOOTHMUSCAB    PT/INR  Recent Labs     03/25/21 2221   PROTIME 11.5   INR 1.1       Cancer Markers:  CEA:  No results for input(s): CEA in the last 72 hours. Ca 125:  No results for input(s):  in the last 72 hours. Ca 19-9:   Invalid input(s):   AFP: No results for input(s): AFP in the last 72 hours. Lactic acid:Invalid input(s): LACTIC ACID    Radiology Review:    No results found. Principal Problem:    Duodenal ulcer  Active Problems:    GI bleed    Acute GI bleeding    Overweight (BMI 25.0-29. 9)    Caffeine adverse reaction    Smoker    Hypokalemia    Hyponatremia    Acute blood loss anemia    Elevated lipase    Hypocalcemia    Acute pancreatitis  Resolved Problems:    * No resolved hospital problems. *       GI Impression:    1. Hematemesis-resolved. 2. Pgsahs-oupihzyg-genyzk post 3/26/2021 EGD that revealed mild antral gastritis and large duodenal bulb ulceration with pigmented spots. Active bleeding. No intervention or treatment required. Globin is stable    Plan and Recommendations:    1. Soft diet x2 weeks  2. Patient will need to continue on Protonix 40 mg p.o. twice daily  3. Pt will need to follow-up in the office in 2 to 3 weeks to discuss biopsy results as he may need additional treatment  4. Avoid NSAIDs, smoking, drugs, alcohol  5.  GI will sign off      This plan was formulated in collaboration with Dr. Wilber Frank MD    Thank you

## 2021-03-28 NOTE — PROGRESS NOTES
PCP but understands that he will need to. Patient noted to have 4 unit drop of hemoglobin with no acute blood loss. CT scan showing:  Negative CT examination with no evidence of acute process within the abdomen   or pelvis. The initial plan included:  \"Acute GI bleed as well as concern for gastritis. Appreciate GI recommendations. Plan for EGD today. Protonix IV in the interim  Poor sleep hygiene with excessive caffeine use. Discussed ways to decrease caffeine use as well as to limit caffeine in the evenings  Encourage smoking cessation  Patient needs PCP\"     On 3/26 the patient underwent EGD:  Findings:  Esophagus: Normal  Stomach: Stomach had moderate gastritis in the antrum characterized by mucosal edema and small erosions. This was biopsied with cold biopsy forceps to rule out H. pylori infection. The rest of the stomach was otherwise normal.  Duodenum: Duodenal bulb had a large ulcer with some pigmented spots without any active bleeding. The second portion of the duodenum was normal   Recommendations: PPI drip for 48 to 72 hours and then PPI twice daily. Keep n.p.o. today. If no further evidence of bleeding then may start clear liquids tomorrow. Avoid nonsteroidals. The patient responded to treatment  Discharge planning was initiated    Medications: Allergies:  No Known Allergies    Current Meds:   Scheduled Meds:    calcium carbonate  1,000 mg Oral Once    [START ON 3/29/2021] sodium chloride (PF)  10 mL Intravenous Daily    pantoprazole  40 mg Oral BID AC    sodium chloride flush  10 mL Intravenous 2 times per day     Continuous Infusions:    sodium chloride 150 mL/hr at 03/28/21 0255    sodium chloride 20 mL/hr at 03/26/21 1403     PRN Meds: aluminum & magnesium hydroxide-simethicone, sodium chloride flush, promethazine **OR** ondansetron, acetaminophen **OR** acetaminophen, morphine    Data:     Past Medical History:   has no past medical history on file.     Social History: reports that he has been smoking cigarettes. He has a 10.00 pack-year smoking history. He does not have any smokeless tobacco history on file. He reports current alcohol use. He reports current drug use. Drug: Marijuana. Family History: No family history on file. Review of Systems:     Review of Systems   Constitutional: Positive for appetite change (Improving). Respiratory: Negative for cough and shortness of breath. Cardiovascular: Negative for chest pain and palpitations. Gastrointestinal: Positive for abdominal pain (Mild epigastric and left upper quadrant pain). Negative for blood in stool, nausea and vomiting. Genitourinary: Negative for flank pain and hematuria. Physical Examination:        Physical Exam  Vitals signs reviewed. Constitutional:       General: He is not in acute distress. Appearance: He is not diaphoretic. HENT:      Head: Normocephalic. Nose: Nose normal.   Eyes:      General: No scleral icterus. Conjunctiva/sclera: Conjunctivae normal.   Neck:      Musculoskeletal: Neck supple. Trachea: No tracheal deviation. Cardiovascular:      Rate and Rhythm: Normal rate and regular rhythm. Pulmonary:      Effort: Pulmonary effort is normal. No respiratory distress. Breath sounds: Normal breath sounds. No wheezing or rales. Chest:      Chest wall: No tenderness. Abdominal:      General: Bowel sounds are normal. There is no distension. Palpations: Abdomen is soft. Tenderness: There is no abdominal tenderness. There is no guarding or rebound. Musculoskeletal:         General: No tenderness. Skin:     General: Skin is warm and dry. Neurological:      Mental Status: He is alert and oriented to person, place, and time.        Vitals:  /78   Pulse 76   Temp 98 °F (36.7 °C) (Temporal)   Resp 18   Ht 5' 7\" (1.702 m)   Wt 195 lb 11.2 oz (88.8 kg)   SpO2 98%   BMI 30.65 kg/m²   Temp (24hrs), Av °F (36.7 °C), Min:97.8 °F (36.6 °C), Max:98.2 °F (36.8 °C)    No results for input(s): POCGLU in the last 72 hours. I/O (24Hr): No intake or output data in the 24 hours ending 03/28/21 0904    Labs:  Hematology:  Recent Labs     03/25/21 2221 03/25/21 2221 03/27/21 0527 03/27/21 2007 03/28/21  0546   WBC 14.9*  --   --   --   --    RBC 5.36  --   --   --   --    HGB 16.5   < > 11.0* 10.5* 10.2*   HCT 48.2   < > 35.0* 32.1* 32.5*   MCV 89.9  --   --   --   --    MCH 30.8  --   --   --   --    MCHC 34.2  --   --   --   --    RDW 12.2  --   --   --   --      --   --   --   --    MPV 10.8  --   --   --   --    INR 1.1  --   --   --   --     < > = values in this interval not displayed. Chemistry:  Recent Labs     03/25/21 2221 03/26/21 0453 03/28/21  0546   * 133* 139   K 3.4* 3.5* 4.0   CL 92* 98 109*   CO2 26 25 23   GLUCOSE 151* 96 89   BUN 23* 19 7   CREATININE 1.02 0.81 0.74   MG 2.3 2.1  --    ANIONGAP 14 10 7*   LABGLOM >60 >60 >60   GFRAA >60 >60 >60   CALCIUM 10.5* 9.0 8.2*   LACTACIDWB 2.1  --   --      Recent Labs     03/25/21 2221 03/28/21  0546   PROT 8.7*  --    LABALBU 4.7  --    AST 16  --    ALT 18  --    ALKPHOS 54  --    BILITOT 0.96  --    AMYLASE  --  132*   LIPASE 70* 74*     ABG:No results found for: POCPH, PHART, PH, POCPCO2, XLA1JFC, PCO2, POCPO2, PO2ART, PO2, POCHCO3, SGI8EDG, HCO3, NBEA, PBEA, BEART, BE, THGBART, THB, AZB2QBT, WWDO0BXG, D2TTVVPL, O2SAT, FIO2  Lab Results   Component Value Date/Time    SPECIAL R ARM 10ML 03/26/2021 05:43 PM     Lab Results   Component Value Date/Time    CULTURE NO GROWTH 2 DAYS 03/26/2021 05:43 PM       Radiology:  Ct Abdomen Pelvis W Iv Contrast Additional Contrast? None    Result Date: 3/26/2021  Negative CT examination with no evidence of acute process within the abdomen or pelvis. Assessment:        Principal Problem:    Duodenal ulcer  Active Problems:    GI bleed    Acute GI bleeding    Overweight (BMI 25.0-29. 9)    Caffeine adverse reaction Smoker    Hypokalemia    Hyponatremia    Acute blood loss anemia    Elevated lipase    Hypocalcemia    Acute pancreatitis  Resolved Problems:    * No resolved hospital problems. *      Plan:        GI evaluation and follow-up  Diet initiated  Blood products as needed  PPI  Smoking cessation  Caffeine abstinence  Alcohol abstinence  Correct electrolyte abnormalities  Discharge planning  Will discharge when arrangements complete and ok with other services. Follow-up with PCP in one week, No primary care provider on file.   Notify PCP of discharge      IP CONSULT TO HOSPITALIST  IP CONSULT TO SHERIF Giron DO  3/28/2021  9:04 AM

## 2021-03-29 NOTE — DISCHARGE SUMMARY
[facility]    Office: [address], DO, Ranjeet Labs, DO, Peyman Norton, DO, Roxi Bae Blood, DO, Ana Little MD, Leon Jovel MD, Gomze Molina MD, Val Shaw MD, Jen Ceron MD, Brenda David MD, Monserrat Ayala MD, Kris Loera MD, Rowan Robert MD, Miladys Robertson, DO, Dorothy Christian MD, Radha Lo MD, Nomi Ramirez DO, Clair Cornell MD,  Holly Tai DO, Jaskaran Connor MD, Jesu White MD, Cyrus Gaona, [facility], [facility], CNP, Darien Restrepo, CNP, Kay Davis, CNS, Jeremias Salazar, CNP, Jayjay Mcintosh, CNP, Pricilla Mcdonald, CNP, Antonino Mensah, CNP, Ronda Cassidy, CNP, Odalis Grady PA-C, Rowan Greene, [facility], Damaris Cassidy, CNP, Sophia Oms, CNP, Tasha Chavez, CNP, Mara Kaiser, CNP, Bj Krueger, [facility]   [address] [facility]    Discharge Summary    Patient ID: Miguel Haile  :  1980   MRN: [id number]     ACCOUNT:  [de-identified]   Patient's PCP: No primary care provider on file. Admit Date: 3/25/2021   Discharge Date: 3/28/2021    Discharge Physician: Kannan Agarwal DO     The patient was seen and examined on day of discharge and this discharge summary is in conjunction with any daily progress note from day of discharge. Active Discharge Diagnoses:     Primary Problem  Duodenal ulcer      Hospital Problems  Active Hospital Problems    Diagnosis Date Noted    Hypocalcemia [E83.51] 2021    Acute pancreatitis [K85.90] 2021    Hypokalemia [E87.6] 2021    Hyponatremia [E87.1] 2021    Acute blood loss anemia [D62] 2021    Elevated lipase [R74.8] 2021    Acute GI bleeding [K92.2] 2021    Duodenal ulcer [K26.9] 2021    Overweight (BMI 25.0-29. 9) [E66.3] 2021    Caffeine adverse reaction [T43.615A] 2021    Smoker [F17.200] 2021    GI bleed [K92.2] 2021         Hospital Course:     Brief History:  As documented in the medical record:  43-year-old male no significant past medical history presents with nausea vomiting abdominal pain that have been persisting for the past few days.  Patient states that he has also been feeling constipated as well.  Patient admits to drinking coffee in the morning as well as a red bull in the afternoon.  Does drink multiple caffeinated drinks throughout the day as well as carbonated drinks in the afternoon.  Does not follow-up with a PCP but understands that he will need to.  Patient noted to have 4 unit drop of hemoglobin with no acute blood loss.  CT scan showing:  Negative CT examination with no evidence of acute process within the abdomen   or pelvis.      The initial plan included:  \"Acute GI bleed as well as concern for gastritis.  Appreciate GI recommendations.  Plan for EGD today.  Protonix IV in the interim  Poor sleep hygiene with excessive caffeine use.  Discussed ways to decrease caffeine use as well as to limit caffeine in the evenings  Encourage smoking cessation  Patient needs PCP\"      On 3/26 the patient underwent EGD:  Findings:  Esophagus: Normal  Stomach: Stomach had moderate gastritis in the antrum characterized by mucosal edema and small erosions.  This was biopsied with cold biopsy forceps to rule out H. pylori infection.  The rest of the stomach was otherwise normal.  Duodenum: Duodenal bulb had a large ulcer with some pigmented spots without any active bleeding.  The second portion of the duodenum was normal   Recommendations: PPI drip for 48 to 72 hours and then PPI twice daily. Keep n.p.o. today.  If no further evidence of bleeding then may start clear liquids tomorrow.   Avoid nonsteroidals.     The patient responded to treatment  Discharge planning was initiated      Discharge plan:     GI evaluation and follow-up as scheduled   Blood products as needed  PPI  Smoking cessation  Caffeine abstinence  Alcohol abstinence  Correct electrolyte abnormalities  Follow-up with PCP in one week  Notify PCP of discharge    No discharge procedures on file. Significant Diagnostic Studies:     Labs / Micro:  Labs:  Hematology:  Recent Labs     03/25/21 2221 03/25/21 2221 03/27/21 0527 03/27/21 2007 03/28/21  0546   WBC 14.9*  --   --   --   --    RBC 5.36  --   --   --   --    HGB 16.5   < > 11.0* 10.5* 10.2*   HCT 48.2   < > 35.0* 32.1* 32.5*   MCV 89.9  --   --   --   --    MCH 30.8  --   --   --   --    MCHC 34.2  --   --   --   --    RDW 12.2  --   --   --   --      --   --   --   --    MPV 10.8  --   --   --   --    INR 1.1  --   --   --   --     < > = values in this interval not displayed. Chemistry:  Recent Labs     03/25/21 2221 03/26/21 0453 03/28/21  0546   * 133* 139   K 3.4* 3.5* 4.0   CL 92* 98 109*   CO2 26 25 23   GLUCOSE 151* 96 89   BUN 23* 19 7   CREATININE 1.02 0.81 0.74   MG 2.3 2.1  --    ANIONGAP 14 10 7*   LABGLOM >60 >60 >60   GFRAA >60 >60 >60   CALCIUM 10.5* 9.0 8.2*   LACTACIDWB 2.1  --   --      Recent Labs     03/25/21 2221 03/28/21  0546   PROT 8.7*  --    LABALBU 4.7  --    AST 16  --    ALT 18  --    ALKPHOS 54  --    BILITOT 0.96  --    AMYLASE  --  132*   LIPASE 70* 74*         Radiology:    Ct Abdomen Pelvis W Iv Contrast Additional Contrast? None    Result Date: 3/26/2021  EXAMINATION: CT OF THE ABDOMEN AND PELVIS WITH CONTRAST 3/26/2021 7:46 am TECHNIQUE: CT of the abdomen and pelvis was performed with the administration of intravenous contrast. Multiplanar reformatted images are provided for review. Dose modulation, iterative reconstruction, and/or weight based adjustment of the mA/kV was utilized to reduce the radiation dose to as low as reasonably achievable. COMPARISON: None. HISTORY: ORDERING SYSTEM PROVIDED HISTORY: GI bleed drop in Hb Reason for Exam: gi bleed FINDINGS: Lower Chest: Normal heart size. Left greater than right bibasilar subsegmental atelectasis. . Organs:  The liver, pancreas, adrenal glands kidneys and gallbladder appear unremarkable. Spleen is mildly atrophic with punctate calcified granuloma. GI/Bowel: No evidence of bowel obstruction or inflammation. Normal appendix. Please note examination is not protocol for dedicated GI bleed assessment. Pelvis: Bladder and prostate appear unremarkable Peritoneum/Retroperitoneum: Normal caliber abdominal aorta. No suspicious lymphadenopathy. No ascites or free air. Bones/Soft Tissues: No significant osseous abnormality. Negative CT examination with no evidence of acute process within the abdomen or pelvis. Consultations:    Consults:     Final Specialist Recommendations/Findings:   IP CONSULT TO HOSPITALIST  IP CONSULT TO GI        Discharged Condition:    Stable     Disposition: Home    Physician Follow Up:   [facility]  [address] [address]  [address] [facility] [id number] [phone]  Schedule an appointment as soon as possible for a visit  Please call office to be scheduled for follow-up appointment to discuss your biopsy results because you may need additional treatment       Activity:  activity as tolerated    Diet:  Low fat, no ETOH     Discharge Medications:      Medication List      START taking these medications    promethazine 12.5 MG tablet  Commonly known as: PHENERGAN  Take 1 tablet by mouth every 6 hours as needed for Nausea        CHANGE how you take these medications    pantoprazole 40 MG tablet  Commonly known as: PROTONIX  Take 1 tablet by mouth 2 times daily (before meals)  What changed:   · medication strength  · when to take this        CONTINUE taking these medications    aluminum & magnesium hydroxide-simethicone 400-400-40 MG/5ML Susp  Commonly known as:  Maalox Advanced Max St  Take 15 mLs by mouth every 6 hours as needed (Heartburn)           Where to Get Your Medications      These medications were sent to [facility] [address] [facility], [address]

## 2021-03-30 LAB — SURGICAL PATHOLOGY REPORT: NORMAL

## 2021-04-01 LAB
CULTURE: NORMAL
CULTURE: NORMAL
Lab: NORMAL
Lab: NORMAL
SPECIMEN DESCRIPTION: NORMAL
SPECIMEN DESCRIPTION: NORMAL

## 2021-04-02 ENCOUNTER — TELEPHONE (OUTPATIENT)
Dept: GASTROENTEROLOGY | Age: 41
End: 2021-04-02

## 2021-04-02 DIAGNOSIS — A04.8 H. PYLORI INFECTION: Primary | ICD-10-CM

## 2021-04-02 RX ORDER — CLARITHROMYCIN 500 MG/1
500 TABLET, COATED ORAL 2 TIMES DAILY
Qty: 28 TABLET | Refills: 0 | Status: SHIPPED | OUTPATIENT
Start: 2021-04-02 | End: 2021-04-16

## 2021-04-02 RX ORDER — AMOXICILLIN 500 MG/1
1000 CAPSULE ORAL 2 TIMES DAILY
Qty: 56 CAPSULE | Refills: 0 | Status: SHIPPED | OUTPATIENT
Start: 2021-04-02 | End: 2021-04-16

## 2021-04-02 NOTE — TELEPHONE ENCOUNTER
Per note from Dr. Villafana Postal:     Lora Arnett visit with DR Chris Tao 1-2 weeks for pUD and H pylori Rx.

## 2021-04-12 ENCOUNTER — OFFICE VISIT (OUTPATIENT)
Dept: GASTROENTEROLOGY | Age: 41
End: 2021-04-12

## 2021-04-12 VITALS — WEIGHT: 189 LBS | SYSTOLIC BLOOD PRESSURE: 145 MMHG | DIASTOLIC BLOOD PRESSURE: 97 MMHG | BODY MASS INDEX: 29.6 KG/M2

## 2021-04-12 DIAGNOSIS — K27.9 PEPTIC ULCER DISEASE: Primary | ICD-10-CM

## 2021-04-12 PROCEDURE — 99213 OFFICE O/P EST LOW 20 MIN: CPT | Performed by: INTERNAL MEDICINE

## 2021-04-12 ASSESSMENT — ENCOUNTER SYMPTOMS
ALLERGIC/IMMUNOLOGIC NEGATIVE: 1
ABDOMINAL PAIN: 1
RESPIRATORY NEGATIVE: 1
EYES NEGATIVE: 1

## 2021-04-12 NOTE — PROGRESS NOTES
GI FOLLOW UP    INTERVAL HISTORY:     Recent hospitalization for peptic ulcer disease  H. pylori positive      Chief Complaint   Patient presents with    Follow-up     hospital follow up- PUD and H pylori positive. Patient has not picked up medication yet but states he will when he leaves today.  Gastroesophageal Reflux     Patient states taking his protonix BID and his reflux symptoms are rare.  Abdominal Pain     PAtient states having pain every other day in his LLQ. Notices the pain after he eats. Peptic ulcer disease    HISTORY OF PRESENT ILLNESS: Nelly Mcallister is a 38 y.o. male with a past history remarkable for recent upper gastrointestinal bleed identified to have a large duodenal ulcer, inactive at the time of examination, H. pylori associated, patient reports that he previously had stomach ulcers in the past.  He is an active smoker and uses marijuana with occasional alcohol use. Currently the patient denies any bleeding  Clinically doing well from GI standpoint  Compliant with medication which include PPI twice daily      Past Medical,Family, and Social History reviewed and does contribute to the patient presenting condition. Patient's PMH/PSH,SH,PSYCH Hx, MEDs, ALLERGIES, and ROS were all reviewed and updated in the appropriate sections. PAST MEDICAL HISTORY:  No past medical history on file.     Past Surgical History:   Procedure Laterality Date    UPPER GASTROINTESTINAL ENDOSCOPY N/A 3/26/2021    EGD BIOPSY performed by Roxi Tolbert MD at [facility]       CURRENT MEDICATIONS:    Current Outpatient Medications:     amoxicillin (AMOXIL) 500 MG capsule, Take 2 capsules by mouth 2 times daily for 14 days, Disp: 56 capsule, Rfl: 0    clarithromycin (BIAXIN) 500 MG tablet, Take 1 tablet by mouth 2 times daily for 14 days, Disp: 28 tablet, Rfl: 0    pantoprazole (PROTONIX) 40 MG tablet, Take 1 tablet by mouth 2 times daily (before meals), Disp: 60 tablet, Rfl: 3    aluminum & magnesium hydroxide-simethicone (MAALOX ADVANCED MAX ST) 400-400-40 MG/5ML SUSP, Take 15 mLs by mouth every 6 hours as needed (Heartburn), Disp: 1 Bottle, Rfl: 0    ALLERGIES:   No Known Allergies    FAMILY HISTORY: No family history on file.       SOCIAL HISTORY:   Social History     Socioeconomic History    Marital status: Single     Spouse name: Not on file    Number of children: Not on file    Years of education: Not on file    Highest education level: Not on file   Occupational History    Not on file   Social Needs    Financial resource strain: Not on file    Food insecurity     Worry: Not on file     Inability: Not on file    Transportation needs     Medical: Not on file     Non-medical: Not on file   Tobacco Use    Smoking status: Current Every Day Smoker     Packs/day: 0.50     Years: 20.00     Pack years: 10.00     Types: Cigarettes     Start date: 2006    Smokeless tobacco: Never Used   Substance and Sexual Activity    Alcohol use: Yes     Comment: socially     Drug use: Yes     Types: Marijuana     Comment: occasionally     Sexual activity: Not on file   Lifestyle    Physical activity     Days per week: Not on file     Minutes per session: Not on file    Stress: Not on file   Relationships    Social connections     Talks on phone: Not on file     Gets together: Not on file     Attends Gnosticist service: Not on file     Active member of club or organization: Not on file     Attends meetings of clubs or organizations: Not on file     Relationship status: Not on file    Intimate partner violence     Fear of current or ex partner: Not on file     Emotionally abused: Not on file     Physically abused: Not on file     Forced sexual activity: Not on file   Other Topics Concern    Not on file   Social History Narrative    Not on file       REVIEW OF SYSTEMS: A 12-point review of systems was obtained and pertinent positives and negatives were listed below. REVIEW OF SYSTEMS:     Constitutional: No fever, no chills, no lethargy, no weakness. HEENT:  No headache, otalgia, itchy eyes, nasal discharge or sore throat. Cardiac:  No chest pain, dyspnea, orthopnea or PND. Chest:   No cough, phlegm or wheezing. Abdomen:      Detailed by MA   Neuro:  No focal weakness, abnormal movements or seizure like activity. Skin:   No rashes, no itching. :   No hematuria, no pyuria, no dysuria, no flank pain. Extremities:  No swelling or joint pains. ROS was otherwise negative    Review of Systems   Constitutional: Negative. HENT: Negative. Eyes: Negative. Respiratory: Negative. Cardiovascular: Negative. Gastrointestinal: Positive for abdominal pain. Endocrine: Negative. Genitourinary: Negative. Musculoskeletal: Negative. Skin: Negative. Allergic/Immunologic: Negative. Neurological: Negative. Hematological: Negative. Psychiatric/Behavioral: Negative. All other systems reviewed and are negative. PHYSICAL EXAMINATION: Vital signs reviewed per the nursing documentation. BP (!) 147/100   Wt 189 lb (85.7 kg)   BMI 29.60 kg/m²   Body mass index is 29.6 kg/m². Physical Exam    Physical Exam   Constitutional: Patient is oriented to person, place, and time. Patient appears well-developed and well-nourished. HENT:   Head: Normocephalic and atraumatic. Eyes: Pupils are equal, round, and reactive to light. EOM are normal.   Neck: Normal range of motion. Neck supple. No JVD present. No tracheal deviation present. No thyromegaly present. Cardiovascular: Normal rate, regular rhythm, normal heart sounds and intact distal pulses. Pulmonary/Chest: Effort normal and breath sounds normal. No stridor. No respiratory distress. He has no wheezes. He has no rales. He exhibits no tenderness. Abdominal: Soft.  Bowel sounds are normal. He exhibits no distension and no mass. There is no tenderness. There is no rebound and no guarding. No hernia. Musculoskeletal: Normal range of motion. Lymphadenopathy:    Patient has no cervical adenopathy. Neurological: Patient is alert and oriented to person, place, and time. Psychiatric: Patient has a normal mood and affect. Patient behavior is normal.       LABORATORY DATA: Reviewed  Lab Results   Component Value Date    WBC 14.9 (H) 03/25/2021    HGB 10.2 (L) 03/28/2021    HCT 32.5 (L) 03/28/2021    MCV 89.9 03/25/2021     03/25/2021     03/28/2021    K 4.0 03/28/2021     (H) 03/28/2021    CO2 23 03/28/2021    BUN 7 03/28/2021    CREATININE 0.74 03/28/2021    LABALBU 4.7 03/25/2021    BILITOT 0.96 03/25/2021    ALKPHOS 54 03/25/2021    AST 16 03/25/2021    ALT 18 03/25/2021    INR 1.1 03/25/2021         Lab Results   Component Value Date    RBC 5.36 03/25/2021    HGB 10.2 (L) 03/28/2021    MCV 89.9 03/25/2021    MCH 30.8 03/25/2021    MCHC 34.2 03/25/2021    RDW 12.2 03/25/2021    MPV 10.8 03/25/2021    BASOPCT 1 03/25/2021    LYMPHSABS 2.40 03/25/2021    MONOSABS 0.90 03/25/2021    NEUTROABS 11.35 (H) 03/25/2021    EOSABS 0.07 03/25/2021    BASOSABS 0.08 03/25/2021         DIAGNOSTIC TESTING:     Ct Abdomen Pelvis W Iv Contrast Additional Contrast? None    Result Date: 3/26/2021  EXAMINATION: CT OF THE ABDOMEN AND PELVIS WITH CONTRAST 3/26/2021 7:46 am TECHNIQUE: CT of the abdomen and pelvis was performed with the administration of intravenous contrast. Multiplanar reformatted images are provided for review. Dose modulation, iterative reconstruction, and/or weight based adjustment of the mA/kV was utilized to reduce the radiation dose to as low as reasonably achievable. COMPARISON: None. HISTORY: ORDERING SYSTEM PROVIDED HISTORY: GI bleed drop in Hb Reason for Exam: gi bleed FINDINGS: Lower Chest: Normal heart size. Left greater than right bibasilar subsegmental atelectasis. . Organs:  The liver, pancreas, adrenal glands kidneys and gallbladder appear unremarkable. Spleen is mildly atrophic with punctate calcified granuloma. GI/Bowel: No evidence of bowel obstruction or inflammation. Normal appendix. Please note examination is not protocol for dedicated GI bleed assessment. Pelvis: Bladder and prostate appear unremarkable Peritoneum/Retroperitoneum: Normal caliber abdominal aorta. No suspicious lymphadenopathy. No ascites or free air. Bones/Soft Tissues: No significant osseous abnormality. Negative CT examination with no evidence of acute process within the abdomen or pelvis. Assessment  No diagnosis found. IMPRESSION: Rosendo Madrid is a 38 y.o. male with a past history remarkable for recent upper gastrointestinal bleed identified to have a large duodenal ulcer, inactive at the time of examination, H. pylori associated, patient reports that he previously had stomach ulcers in the past.  He is an active smoker and uses marijuana with occasional alcohol use. Currently the patient denies any bleeding  Clinically doing well from GI standpoint  Compliant with medication which include PPI twice daily      PLAN:    1) Peptic ulcer diseaselarge duodenal bulb ulcer with pigmented spots without any active bleeding. Currently on PPI 40 mg twice daily. This is to be continued. Patient to be treated for H. pylori with triple therapy with breath testing that is to follow. Advised to avoid smoking, alcohol and NSAIDs. Patient agreed to do so    2) GI bleedingresolved, normal bowel movements currently. 3) H. Pylori treatmentto receive triple therapy  Follow-up in 6 weeks    Thank you for allowing me to participate in the care of Mr. Ailyn Rivas. For any further questions please do not hesitate to contact me. I have reviewed and agree with the ROS entered by the MA/LPN from today's encounter documented in a separate note.         Mara Olson MD, MPH   [facility]  Office #: [phone]    this note is created with the assistance of a speech recognition program.  While intending to generate a document that actually reflects the content of the visit, the document can still have some errors including those of syntax and sound a like substitutions which may escape proof reading. It such instances, actual meaning can be extrapolated by contextual diversion.

## 2021-07-13 ENCOUNTER — APPOINTMENT (OUTPATIENT)
Dept: GENERAL RADIOLOGY | Age: 41
End: 2021-07-13
Payer: COMMERCIAL

## 2021-07-13 ENCOUNTER — HOSPITAL ENCOUNTER (EMERGENCY)
Age: 41
Discharge: HOME | End: 2021-07-14
Attending: EMERGENCY MEDICINE
Payer: COMMERCIAL

## 2021-07-13 VITALS
RESPIRATION RATE: 18 BRPM | OXYGEN SATURATION: 100 % | SYSTOLIC BLOOD PRESSURE: 169 MMHG | HEART RATE: 87 BPM | TEMPERATURE: 99.5 F | DIASTOLIC BLOOD PRESSURE: 110 MMHG

## 2021-07-13 DIAGNOSIS — S93.04XA ANKLE DISLOCATION, RIGHT, INITIAL ENCOUNTER: Primary | ICD-10-CM

## 2021-07-13 DIAGNOSIS — S82.831A OTHER CLOSED FRACTURE OF DISTAL END OF RIGHT FIBULA, INITIAL ENCOUNTER: ICD-10-CM

## 2021-07-13 PROCEDURE — 73562 X-RAY EXAM OF KNEE 3: CPT

## 2021-07-13 PROCEDURE — 73630 X-RAY EXAM OF FOOT: CPT

## 2021-07-13 PROCEDURE — 73610 X-RAY EXAM OF ANKLE: CPT

## 2021-07-13 PROCEDURE — 6360000002 HC RX W HCPCS: Performed by: STUDENT IN AN ORGANIZED HEALTH CARE EDUCATION/TRAINING PROGRAM

## 2021-07-13 PROCEDURE — 96375 TX/PRO/DX INJ NEW DRUG ADDON: CPT

## 2021-07-13 PROCEDURE — 73600 X-RAY EXAM OF ANKLE: CPT

## 2021-07-13 PROCEDURE — 99285 EMERGENCY DEPT VISIT HI MDM: CPT

## 2021-07-13 PROCEDURE — 73590 X-RAY EXAM OF LOWER LEG: CPT

## 2021-07-13 PROCEDURE — 29515 APPLICATION SHORT LEG SPLINT: CPT

## 2021-07-13 PROCEDURE — 96374 THER/PROPH/DIAG INJ IV PUSH: CPT

## 2021-07-13 RX ORDER — FENTANYL CITRATE 50 UG/ML
50 INJECTION, SOLUTION INTRAMUSCULAR; INTRAVENOUS ONCE
Status: COMPLETED | OUTPATIENT
Start: 2021-07-13 | End: 2021-07-13

## 2021-07-13 RX ORDER — MORPHINE SULFATE 4 MG/ML
4 INJECTION, SOLUTION INTRAMUSCULAR; INTRAVENOUS ONCE
Status: COMPLETED | OUTPATIENT
Start: 2021-07-13 | End: 2021-07-13

## 2021-07-13 RX ORDER — FENTANYL CITRATE 50 UG/ML
100 INJECTION, SOLUTION INTRAMUSCULAR; INTRAVENOUS ONCE
Status: DISCONTINUED | OUTPATIENT
Start: 2021-07-13 | End: 2021-07-14

## 2021-07-13 RX ORDER — LIDOCAINE HYDROCHLORIDE 10 MG/ML
20 INJECTION, SOLUTION INFILTRATION; PERINEURAL ONCE
Status: DISCONTINUED | OUTPATIENT
Start: 2021-07-13 | End: 2021-07-14 | Stop reason: HOSPADM

## 2021-07-13 RX ADMIN — FENTANYL CITRATE 50 MCG: 50 INJECTION, SOLUTION INTRAMUSCULAR; INTRAVENOUS at 23:16

## 2021-07-13 RX ADMIN — MORPHINE SULFATE 4 MG: 4 INJECTION INTRAVENOUS at 22:22

## 2021-07-13 ASSESSMENT — PAIN DESCRIPTION - LOCATION: LOCATION: ANKLE

## 2021-07-13 ASSESSMENT — PAIN DESCRIPTION - ORIENTATION: ORIENTATION: RIGHT

## 2021-07-13 ASSESSMENT — PAIN SCALES - GENERAL
PAINLEVEL_OUTOF10: 10

## 2021-07-13 ASSESSMENT — PAIN DESCRIPTION - DESCRIPTORS: DESCRIPTORS: SHARP

## 2021-07-13 ASSESSMENT — PAIN DESCRIPTION - FREQUENCY: FREQUENCY: CONTINUOUS

## 2021-07-14 RX ORDER — OXYCODONE HYDROCHLORIDE 5 MG/1
5 TABLET ORAL EVERY 8 HOURS PRN
Qty: 9 TABLET | Refills: 0 | Status: SHIPPED | OUTPATIENT
Start: 2021-07-14 | End: 2021-07-17

## 2021-07-14 ASSESSMENT — ENCOUNTER SYMPTOMS
VOMITING: 0
ABDOMINAL PAIN: 0
FACIAL SWELLING: 0
BACK PAIN: 0
SHORTNESS OF BREATH: 0
NAUSEA: 0
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
## 2021-07-14 NOTE — ED NOTES
Pt does not want to stay for CT scan. Ortho doc notified. Dr. Sebastian states he recommends for pt to stay for CT scan, but if he decides not to, he is medically clear to be discharged home. Pt verbalized understanding and decided to be discharged.       Rosa De Guzman RN  07/14/21 1002
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
## 2021-07-14 NOTE — ED NOTES
Bed: 12  Expected date:   Expected time:   Means of arrival:   Comments:     Alberto Slater RN  07/13/21 [id number]
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
## 2021-07-15 ENCOUNTER — OFFICE VISIT (OUTPATIENT)
Dept: ORTHOPEDIC SURGERY | Age: 41
End: 2021-07-15
Payer: COMMERCIAL

## 2021-07-15 DIAGNOSIS — S82.891D CLOSED FRACTURE OF RIGHT ANKLE WITH ROUTINE HEALING, SUBSEQUENT ENCOUNTER: Primary | ICD-10-CM

## 2021-07-15 PROCEDURE — 99203 OFFICE O/P NEW LOW 30 MIN: CPT | Performed by: STUDENT IN AN ORGANIZED HEALTH CARE EDUCATION/TRAINING PROGRAM

## 2021-07-15 RX ORDER — OXYCODONE HYDROCHLORIDE AND ACETAMINOPHEN 5; 325 MG/1; MG/1
1 TABLET ORAL EVERY 6 HOURS PRN
Qty: 24 TABLET | Refills: 0 | Status: SHIPPED | OUTPATIENT
Start: 2021-07-15 | End: 2021-07-21

## 2021-07-15 NOTE — PROGRESS NOTES
[facility]  [facility]  [address] [facility] [address] [place] [address] [address] [address]  Dept: [phone]    Orthopaedic Trauma New Patient    CHIEF COMPLAINT:    Chief Complaint   Patient presents with    Ankle Injury     right DOI: 7/13/21     HISTORY OF PRESENT ILLNESS:    The patient is a 38 y.o. male who is being seen as a new patient after sustaining a right trimalleolar equivalent ankle fracture on July 13, 2021. He was seen in the emergency department by orthopedics where a intra-articular block was performed with closed reduction and splinting. Patient did not obtain a CT scan prior to being discharged per our recommendation. Patient states he has maintained his splint, he has been occasionally elevating and icing his lower extremity. He states he has been compliant with his nonweightbearing restrictions. He denies previous ankle injury. His past medical history is benign and he takes no daily medications. Past Medical History:    No past medical history on file. Past Surgical History:    Past Surgical History:   Procedure Laterality Date    UPPER GASTROINTESTINAL ENDOSCOPY N/A 3/26/2021    EGD BIOPSY performed by Darcie Hebert MD at [facility]     Current Medications:   Current Outpatient Medications   Medication Sig Dispense Refill    oxyCODONE-acetaminophen (PERCOCET) 5-325 MG per tablet Take 1 tablet by mouth every 6 hours as needed for Pain for up to 6 days. Intended supply: 7 days. Take lowest dose possible to manage pain 24 tablet 0    oxyCODONE (ROXICODONE) 5 MG immediate release tablet Take 1 tablet by mouth every 8 hours as needed for Pain for up to 3 days. Intended supply: 3 days.  Take lowest dose possible to manage pain 9 tablet 0    pantoprazole (PROTONIX) 40 MG tablet Take 1 tablet by mouth 2 times daily (before meals) 60 tablet 3    aluminum & magnesium hydroxide-simethicone (MAALOX ADVANCED MAX ST) 400-400-40 MG/5ML SUSP Take 15 mLs by mouth every 6 hours as needed (Heartburn) 1 Bottle 0     No current facility-administered medications for this visit. Allergies:    Patient has no known allergies. Social History:   Social History     Socioeconomic History    Marital status: Single     Spouse name: Not on file    Number of children: Not on file    Years of education: Not on file    Highest education level: Not on file   Occupational History    Not on file   Tobacco Use    Smoking status: Current Every Day Smoker     Packs/day: 0.50     Years: 20.00     Pack years: 10.00     Types: Cigarettes     Start date: 2006    Smokeless tobacco: Never Used   Substance and Sexual Activity    Alcohol use: Yes     Comment: socially     Drug use: Yes     Types: Marijuana     Comment: occasionally     Sexual activity: Not on file   Other Topics Concern    Not on file   Social History Narrative    Not on file     Social Determinants of Health     Financial Resource Strain:     Difficulty of Paying Living Expenses:    Food Insecurity:     Worried About Running Out of Food in the Last Year:     [address] in the Last Year:    Transportation Needs:     Lack of Transportation (Medical):  Lack of Transportation (Non-Medical):    Physical Activity:     Days of Exercise per Week:     Minutes of Exercise per Session:    Stress:     Feeling of Stress :    Social Connections:     Frequency of Communication with Friends and Family:     Frequency of Social Gatherings with Friends and Family:     Attends Orthodoxy Services:     Active Member of Clubs or Organizations:     Attends Club or Organization Meetings:     Marital Status:    Intimate Partner Violence:     Fear of Current or Ex-Partner:     Emotionally Abused:     Physically Abused:     Sexually Abused:      Family History:  No family history on file.     REVIEW OF SYSTEMS:  Review of Systems    Gen: no fever, chills, malaise  CV: no chest pain or palpitations  Resp: no cough or shortness of breath  GI: no nausea, vomiting, diarrhea, or constipation  Neuro: no numbness, tingling, or weakness  Msk: Swelling and pain to the right ankle. 10 remaining systems reviewed and negative    PHYSICAL EXAM:  There were no vitals taken for this visit. There is no height or weight on file to calculate BMI. Physical Exam  Gen: alert and oriented, no acute distress  Psych: Appropriate affect; Appropriate knowledge base; Appropriate mood; No hallucinations  Head: normocephalic atraumatic   Chest: symmetric chest excursion  Ortho Exam  RLE: Splint in place and is in good repair. Ace wrap and anterior aspect of the splint opened to evaluate swelling. No apparent fracture blisters appreciated. The anterior lateral aspect of the patient's ankle is swollen without ability to wrinkle the skin. Patient is able to actively flex and extend the toes. He endorses sensation to the exposed toes and dorsal aspect of his foot. His foot is warm and well-perfused. Radiology:   Radiographs reviewed from the emergency department demonstrating a trimalleolar equivalent ankle fracture dislocation status post reduction and splinting. ASSESSMENT:  38 y.o. male with right trimalleolar equivalent ankle fracture dislocation status post closed reduction and splinting. PLAN:  1. We discussed the radiographic findings with the patient as well as the history and natural etiology of right ankle fractures. Given the displacement and trimalleolar equivalent nature of his injury we have recommended surgical intervention for treatment. Currently the patient is too swollen for formal surgical intervention. We have encouraged strict elevation and icing over the next 5 days and for the patient to be reevaluated in the office on Tuesday to determine his swelling and appropriateness for surgical intervention. 2.  We did discuss surgical intervention and that surgery is an outpatient procedure.   He will be nonweightbearing on this ankle for approximately 6 weeks. 3.  We did order a outpatient CT scan to be obtained by the patient for preoperative planning. We also provided him with a refill of his Percocet pain medication for 6 days. 4.  Patient encouraged to call return the office sooner with questions or concerns. He is to maintain his nonweightbearing status to the right lower extremity. Again reiterated the need for strict elevation and icing over the next 5 days to make swelling amenable for surgical intervention. All questions were answered his satisfaction, he is aware, stands, and voices willingness to proceed as discussed. Return in about 5 days (around 7/20/2021) for Evaluation without X-rays. Orders Placed This Encounter   Medications    oxyCODONE-acetaminophen (PERCOCET) 5-325 MG per tablet     Sig: Take 1 tablet by mouth every 6 hours as needed for Pain for up to 6 days. Intended supply: 7 days.  Take lowest dose possible to manage pain     Dispense:  24 tablet     Refill:  0     Reduce doses taken as pain becomes manageable     Orders Placed This Encounter   Procedures    CT ANKLE RIGHT WO CONTRAST     Standing Status:   Future     Standing Expiration Date:   7/15/2022     Order Specific Question:   Reason for exam:     Answer:   Fracture characterization     Myrna Ferguson DO  Orthopedic Surgery Resident, PGY-5  [place] [address], [address] [place] [address]    Electronically signed by Myrna Ferguson DO on7/15/2021 at 10:01 AM

## 2021-07-19 ENCOUNTER — HOSPITAL ENCOUNTER (OUTPATIENT)
Dept: CT IMAGING | Age: 41
Discharge: HOME | End: 2021-07-21
Payer: COMMERCIAL

## 2021-07-19 DIAGNOSIS — S82.891D CLOSED FRACTURE OF RIGHT ANKLE WITH ROUTINE HEALING, SUBSEQUENT ENCOUNTER: ICD-10-CM

## 2021-07-19 PROCEDURE — 73700 CT LOWER EXTREMITY W/O DYE: CPT

## 2021-07-20 ENCOUNTER — OFFICE VISIT (OUTPATIENT)
Dept: ORTHOPEDIC SURGERY | Age: 41
End: 2021-07-20
Payer: COMMERCIAL

## 2021-07-20 VITALS — HEIGHT: 67 IN | BODY MASS INDEX: 29.35 KG/M2 | WEIGHT: 187 LBS

## 2021-07-20 DIAGNOSIS — S82.891D CLOSED FRACTURE OF RIGHT ANKLE WITH ROUTINE HEALING, SUBSEQUENT ENCOUNTER: Primary | ICD-10-CM

## 2021-07-20 PROCEDURE — G8427 DOCREV CUR MEDS BY ELIG CLIN: HCPCS | Performed by: STUDENT IN AN ORGANIZED HEALTH CARE EDUCATION/TRAINING PROGRAM

## 2021-07-20 PROCEDURE — 99213 OFFICE O/P EST LOW 20 MIN: CPT | Performed by: STUDENT IN AN ORGANIZED HEALTH CARE EDUCATION/TRAINING PROGRAM

## 2021-07-20 PROCEDURE — G8419 CALC BMI OUT NRM PARAM NOF/U: HCPCS | Performed by: STUDENT IN AN ORGANIZED HEALTH CARE EDUCATION/TRAINING PROGRAM

## 2021-07-20 PROCEDURE — 4004F PT TOBACCO SCREEN RCVD TLK: CPT | Performed by: STUDENT IN AN ORGANIZED HEALTH CARE EDUCATION/TRAINING PROGRAM

## 2021-07-20 RX ORDER — OXYCODONE HYDROCHLORIDE AND ACETAMINOPHEN 5; 325 MG/1; MG/1
1 TABLET ORAL EVERY 6 HOURS PRN
Qty: 28 TABLET | Refills: 0 | Status: SHIPPED | OUTPATIENT
Start: 2021-07-20 | End: 2021-07-27

## 2021-07-20 NOTE — PROGRESS NOTES
[facility]  [facility]  [address] [address]  Dept: [phone]  Dept Fax: [phone]        Orthopaedic Trauma Clinic Follow Up    Subjective:   Date of Injury: 7/13/2021    Jackie Britt is a 38y.o. year old male who presents to the clinic today for routine followup one week after sustaining a right trimalleolar equivalent ankle fracture dislocation that underwent closed reduction and splinting. Patient was seen on Thursday 7/15/21 and was deemed to be too swollen for operative fixation. He presents today for a swelling re-check. Glenys Todd states he has been elevating his right lower extremity. He states he has been applying ice but states he is unsure if it is really reaching deep to the splint. He denies ambulating on the right lower extremity or falling or having any trauma in the interim. Patient denies any fevers, chills, nausea, vomiting, chest pain, shortness of breath, numbness or tingling in the affected extremity. Review of Systems  Gen: no fever, chills, malaise  CV: no chest pain or palpitations  Resp: no cough or shortness of breath  GI: no nausea, vomiting, diarrhea, or constipation  Neuro: no numbness, tingling, or weakness  Msk: Per HPI as above  10 remaining systems reviewed and negative    Objective : There were no vitals filed for this visit. Body mass index is 29.29 kg/m². General: No acute distress, resting comfortably in the clinic  Neuro: alert. oriented  Eyes: Extra-ocular muscles intact  Pulm: Respirations unlabored and regular. Skin: warm, well perfused  Psych:   Patient has good fund of knowledge and displays understanging of exam, diagnosis, and plan. MSK: RLE - previous splint in place, in some disarray but overall intact. Splint opened, skin over the anterior and anterior lateral aspect of the ankle remains swollen, skin shiny and unable to be wrinkled. Patient able to actively flex and extend the toes.  Endorses generalized sensation over the exposed toes and foot. Foot warm and well perfused. Radiology:  No radiographs taken in the office today     Assessment:   38y.o. year old male one week after sustaining a right trimalleolar ankle fracture dislocation on 7/13/21. Plan:   1. Patient continues to remain too swollen for surgical intervention. We encouraged continued elevation and ice to the right ankle to continue to improve swelling prior to surgery    2. We will see the patient back in one week for repeat evaluation and repeat check of his swelling. A refill for the patient's percocet was sent to the pharmacy. 3. Patient encouraged to call or return to the office sooner with questions or concerns. All questions were answered to his satisfaction, he is aware, understands, and voices his willingness to proceed as discussed. Follow up:Return in about 1 week (around 7/27/2021) for Evaluation without X-rays. Orders Placed This Encounter   Medications    oxyCODONE-acetaminophen (PERCOCET) 5-325 MG per tablet     Sig: Take 1 tablet by mouth every 6 hours as needed for Pain for up to 7 days. Intended supply: 7 days. Take lowest dose possible to manage pain     Dispense:  28 tablet     Refill:  0     Reduce doses taken as pain becomes manageable     No orders of the defined types were placed in this encounter.     Daxa Chavez DO  Orthopedic Surgery Resident, PGY-5  [address] [facility]    Electronically signed by Daxa Chavez DO on 7/20/2021 at 1:51 PM

## 2021-07-27 ENCOUNTER — OFFICE VISIT (OUTPATIENT)
Dept: ORTHOPEDIC SURGERY | Age: 41
End: 2021-07-27
Payer: COMMERCIAL

## 2021-07-27 DIAGNOSIS — S82.891D CLOSED FRACTURE OF RIGHT ANKLE WITH ROUTINE HEALING, SUBSEQUENT ENCOUNTER: Primary | ICD-10-CM

## 2021-07-27 PROCEDURE — 99213 OFFICE O/P EST LOW 20 MIN: CPT

## 2021-07-27 NOTE — PROGRESS NOTES
[facility]  [facility]  [address] [address]  Dept: [phone]  Dept Fax: [phone]        Orthopaedic Trauma Clinic Follow Up      Subjective:   Date of Injury: 7/14/2021     Reza Bird is a 38y.o. year old male who presents to the clinic today for routine followup regarding his ankle swell check for his right trimalleolar equivalent ankle fracture. He is currently 2 weeks from his injury. He was evaluated last 2 weeks ago, but deemed to be too swollen for surgery. He was recommended to continue nonweightbearing, ice, and elevation. He came into the clinic with a partially removed ACE wrap, but claims to only have removed his ACE and splint for showering and tub and ice. The patient states that he is ready to go to surgery any time. According to the patient, the swelling of the leg has gone down since his visit 1 week ago. The patient states that he still feels pain in the right ankle joint and foot. He continues to state that he is unable to weightbear, but only is waiting for a day he can go to the OR. He has no other complaints. Review of Systems  Gen: no fever, chills, malaise  CV: no chest pain or palpitations  Resp: no cough or shortness of breath  GI: no nausea, vomiting, diarrhea, or constipation  Neuro: no numbness, tingling, or weakness  Msk: As per HPI  10 remaining systems reviewed and negative    Objective : There were no vitals filed for this visit. There is no height or weight on file to calculate BMI. General: No acute distress, resting comfortably in the clinic  Neuro: alert. oriented  Eyes: Extra-ocular muscles intact  Pulm: Respirations unlabored and regular. Skin: warm, well perfused  Psych:   Patient has good fund of knowledge and displays understanging of exam, diagnosis, and plan. MSK:    RLE:  Previous splint in place, in some disarray but overall intact. Swelling decrease from last visit. Able to wrinkle skin.  Splint opened, skin over the anterior and anterior lateral aspect of the ankle swollen, skin shiny, and abled to be wrinkled. Patient able to actively flex and extend the toes. Endorses generalized sensation over the exposed toes and foot. Foot warm and well perfused    Radiology:    No radiographs taken today    Assessment:   38y.o. year old male with a right trimalleolar equivalent ankle fracture dislocation. Plan:      Patient today came in with inquiries regarding his surgery date. We took a look at his swelling and deemed that he is able to undergo for surgery tomorrow (7/28/2021). He is booked for ankle ORIF for his trimalleolar equivalent fracture tomorrow. We went over his Xrays and gave him detail of what to expect for the surgery. We then gave him consent and booked him for tomorrow at 8:30am. He will be NPO at midnight. He will follow up in the clinic for his post-op and review of wounds/Xrays. Patient was encouraged to call or return to the office sooner with questions. Follow up:Return for post-op visit 10-14 days after surgery. No orders of the defined types were placed in this encounter. No orders of the defined types were placed in this encounter.             Electronically signed by Agustina Melvin DO on 7/27/2021 at 7:49 PM

## 2021-07-28 ENCOUNTER — HOSPITAL ENCOUNTER (OUTPATIENT)
Age: 41
Setting detail: OUTPATIENT SURGERY
Discharge: HOME | End: 2021-07-28
Attending: ORTHOPAEDIC SURGERY | Admitting: ORTHOPAEDIC SURGERY
Payer: COMMERCIAL

## 2021-07-28 ENCOUNTER — ANESTHESIA (OUTPATIENT)
Dept: OPERATING ROOM | Age: 41
End: 2021-07-28
Payer: COMMERCIAL

## 2021-07-28 ENCOUNTER — APPOINTMENT (OUTPATIENT)
Dept: GENERAL RADIOLOGY | Age: 41
End: 2021-07-28
Attending: ORTHOPAEDIC SURGERY
Payer: COMMERCIAL

## 2021-07-28 ENCOUNTER — ANESTHESIA EVENT (OUTPATIENT)
Dept: OPERATING ROOM | Age: 41
End: 2021-07-28
Payer: COMMERCIAL

## 2021-07-28 VITALS
RESPIRATION RATE: 16 BRPM | OXYGEN SATURATION: 98 % | SYSTOLIC BLOOD PRESSURE: 129 MMHG | BODY MASS INDEX: 30.93 KG/M2 | HEIGHT: 67 IN | TEMPERATURE: 97.9 F | DIASTOLIC BLOOD PRESSURE: 64 MMHG | WEIGHT: 197.09 LBS | HEART RATE: 106 BPM

## 2021-07-28 VITALS
DIASTOLIC BLOOD PRESSURE: 69 MMHG | OXYGEN SATURATION: 100 % | TEMPERATURE: 97.9 F | RESPIRATION RATE: 20 BRPM | SYSTOLIC BLOOD PRESSURE: 135 MMHG

## 2021-07-28 DIAGNOSIS — S82.891D CLOSED FRACTURE OF RIGHT ANKLE WITH ROUTINE HEALING, SUBSEQUENT ENCOUNTER: Primary | ICD-10-CM

## 2021-07-28 LAB
SARS-COV-2, RAPID: NOT DETECTED
SPECIMEN DESCRIPTION: NORMAL

## 2021-07-28 PROCEDURE — 2500000003 HC RX 250 WO HCPCS: Performed by: SPECIALIST

## 2021-07-28 PROCEDURE — C1713 ANCHOR/SCREW BN/BN,TIS/BN: HCPCS | Performed by: ORTHOPAEDIC SURGERY

## 2021-07-28 PROCEDURE — 2720000010 HC SURG SUPPLY STERILE: Performed by: ORTHOPAEDIC SURGERY

## 2021-07-28 PROCEDURE — 27823 TREATMENT OF ANKLE FRACTURE: CPT | Performed by: ORTHOPAEDIC SURGERY

## 2021-07-28 PROCEDURE — 3700000001 HC ADD 15 MINUTES (ANESTHESIA): Performed by: ORTHOPAEDIC SURGERY

## 2021-07-28 PROCEDURE — 3600000014 HC SURGERY LEVEL 4 ADDTL 15MIN: Performed by: ORTHOPAEDIC SURGERY

## 2021-07-28 PROCEDURE — 2580000003 HC RX 258: Performed by: SPECIALIST

## 2021-07-28 PROCEDURE — 2500000003 HC RX 250 WO HCPCS

## 2021-07-28 PROCEDURE — 3600000004 HC SURGERY LEVEL 4 BASE: Performed by: ORTHOPAEDIC SURGERY

## 2021-07-28 PROCEDURE — 87635 SARS-COV-2 COVID-19 AMP PRB: CPT

## 2021-07-28 PROCEDURE — 6360000002 HC RX W HCPCS: Performed by: SPECIALIST

## 2021-07-28 PROCEDURE — 2709999900 HC NON-CHARGEABLE SUPPLY: Performed by: ORTHOPAEDIC SURGERY

## 2021-07-28 PROCEDURE — 76942 ECHO GUIDE FOR BIOPSY: CPT | Performed by: ANESTHESIOLOGY

## 2021-07-28 PROCEDURE — 7100000000 HC PACU RECOVERY - FIRST 15 MIN: Performed by: ORTHOPAEDIC SURGERY

## 2021-07-28 PROCEDURE — 2500000003 HC RX 250 WO HCPCS: Performed by: ANESTHESIOLOGY

## 2021-07-28 PROCEDURE — 2580000003 HC RX 258: Performed by: ORTHOPAEDIC SURGERY

## 2021-07-28 PROCEDURE — 7100000001 HC PACU RECOVERY - ADDTL 15 MIN: Performed by: ORTHOPAEDIC SURGERY

## 2021-07-28 PROCEDURE — 3209999900 FLUORO FOR SURGICAL PROCEDURES

## 2021-07-28 PROCEDURE — 6360000002 HC RX W HCPCS: Performed by: ANESTHESIOLOGY

## 2021-07-28 PROCEDURE — 73610 X-RAY EXAM OF ANKLE: CPT

## 2021-07-28 PROCEDURE — 3700000000 HC ANESTHESIA ATTENDED CARE: Performed by: ORTHOPAEDIC SURGERY

## 2021-07-28 PROCEDURE — 64445 NJX AA&/STRD SCIATIC NRV IMG: CPT | Performed by: ANESTHESIOLOGY

## 2021-07-28 PROCEDURE — 7100000010 HC PHASE II RECOVERY - FIRST 15 MIN: Performed by: ORTHOPAEDIC SURGERY

## 2021-07-28 DEVICE — IMPLANTABLE DEVICE: Type: IMPLANTABLE DEVICE | Site: ANKLE | Status: FUNCTIONAL

## 2021-07-28 DEVICE — SCREW BNE L24MM DIA2.7MM SM HEX HD DIA2.5MM CORT S STL ST: Type: IMPLANTABLE DEVICE | Site: ANKLE | Status: FUNCTIONAL

## 2021-07-28 DEVICE — SCREW BNE L18MM DIA3.5MM HD DIA2.7MM PERIARTC CORT S STL ST: Type: IMPLANTABLE DEVICE | Site: ANKLE | Status: FUNCTIONAL

## 2021-07-28 DEVICE — SCREW BNE L18MM DIA2.7MM CORT ANK FT S STL ST CANN: Type: IMPLANTABLE DEVICE | Site: ANKLE | Status: FUNCTIONAL

## 2021-07-28 RX ORDER — SODIUM CHLORIDE, SODIUM LACTATE, POTASSIUM CHLORIDE, CALCIUM CHLORIDE 600; 310; 30; 20 MG/100ML; MG/100ML; MG/100ML; MG/100ML
INJECTION, SOLUTION INTRAVENOUS CONTINUOUS PRN
Status: DISCONTINUED | OUTPATIENT
Start: 2021-07-28 | End: 2021-07-28 | Stop reason: SDUPTHER

## 2021-07-28 RX ORDER — BUPIVACAINE HYDROCHLORIDE 5 MG/ML
INJECTION, SOLUTION EPIDURAL; INTRACAUDAL
Status: COMPLETED | OUTPATIENT
Start: 2021-07-28 | End: 2021-07-28

## 2021-07-28 RX ORDER — CEFAZOLIN SODIUM 2 G/50ML
SOLUTION INTRAVENOUS PRN
Status: DISCONTINUED | OUTPATIENT
Start: 2021-07-28 | End: 2021-07-28 | Stop reason: SDUPTHER

## 2021-07-28 RX ORDER — LIDOCAINE HYDROCHLORIDE 10 MG/ML
INJECTION, SOLUTION EPIDURAL; INFILTRATION; INTRACAUDAL; PERINEURAL PRN
Status: DISCONTINUED | OUTPATIENT
Start: 2021-07-28 | End: 2021-07-28 | Stop reason: SDUPTHER

## 2021-07-28 RX ORDER — FENTANYL CITRATE 50 UG/ML
INJECTION, SOLUTION INTRAMUSCULAR; INTRAVENOUS PRN
Status: DISCONTINUED | OUTPATIENT
Start: 2021-07-28 | End: 2021-07-28 | Stop reason: SDUPTHER

## 2021-07-28 RX ORDER — MIDAZOLAM HYDROCHLORIDE 2 MG/2ML
2 INJECTION, SOLUTION INTRAMUSCULAR; INTRAVENOUS ONCE
Status: COMPLETED | OUTPATIENT
Start: 2021-07-28 | End: 2021-07-28

## 2021-07-28 RX ORDER — FENTANYL CITRATE 50 UG/ML
25 INJECTION, SOLUTION INTRAMUSCULAR; INTRAVENOUS EVERY 5 MIN PRN
Status: DISCONTINUED | OUTPATIENT
Start: 2021-07-28 | End: 2021-07-28 | Stop reason: HOSPADM

## 2021-07-28 RX ORDER — FENTANYL CITRATE 50 UG/ML
50 INJECTION, SOLUTION INTRAMUSCULAR; INTRAVENOUS EVERY 5 MIN PRN
Status: DISCONTINUED | OUTPATIENT
Start: 2021-07-28 | End: 2021-07-28 | Stop reason: HOSPADM

## 2021-07-28 RX ORDER — DOCUSATE SODIUM 100 MG/1
100 CAPSULE, LIQUID FILLED ORAL 2 TIMES DAILY PRN
Qty: 60 CAPSULE | Refills: 0 | Status: SHIPPED | OUTPATIENT
Start: 2021-07-28

## 2021-07-28 RX ORDER — OXYCODONE HYDROCHLORIDE AND ACETAMINOPHEN 5; 325 MG/1; MG/1
1 TABLET ORAL EVERY 6 HOURS PRN
Qty: 28 TABLET | Refills: 0 | Status: SHIPPED | OUTPATIENT
Start: 2021-07-28 | End: 2021-08-04

## 2021-07-28 RX ORDER — ONDANSETRON 2 MG/ML
INJECTION INTRAMUSCULAR; INTRAVENOUS PRN
Status: DISCONTINUED | OUTPATIENT
Start: 2021-07-28 | End: 2021-07-28 | Stop reason: SDUPTHER

## 2021-07-28 RX ORDER — SODIUM CHLORIDE, SODIUM LACTATE, POTASSIUM CHLORIDE, CALCIUM CHLORIDE 600; 310; 30; 20 MG/100ML; MG/100ML; MG/100ML; MG/100ML
INJECTION, SOLUTION INTRAVENOUS CONTINUOUS
Status: DISCONTINUED | OUTPATIENT
Start: 2021-07-28 | End: 2021-07-28 | Stop reason: HOSPADM

## 2021-07-28 RX ORDER — ROCURONIUM BROMIDE 10 MG/ML
INJECTION, SOLUTION INTRAVENOUS PRN
Status: DISCONTINUED | OUTPATIENT
Start: 2021-07-28 | End: 2021-07-28 | Stop reason: SDUPTHER

## 2021-07-28 RX ORDER — PROPOFOL 10 MG/ML
INJECTION, EMULSION INTRAVENOUS PRN
Status: DISCONTINUED | OUTPATIENT
Start: 2021-07-28 | End: 2021-07-28 | Stop reason: SDUPTHER

## 2021-07-28 RX ORDER — GLYCOPYRROLATE 1 MG/5 ML
SYRINGE (ML) INTRAVENOUS PRN
Status: DISCONTINUED | OUTPATIENT
Start: 2021-07-28 | End: 2021-07-28 | Stop reason: SDUPTHER

## 2021-07-28 RX ORDER — DEXAMETHASONE SODIUM PHOSPHATE 10 MG/ML
INJECTION INTRAMUSCULAR; INTRAVENOUS PRN
Status: DISCONTINUED | OUTPATIENT
Start: 2021-07-28 | End: 2021-07-28 | Stop reason: SDUPTHER

## 2021-07-28 RX ORDER — FENTANYL CITRATE 50 UG/ML
100 INJECTION, SOLUTION INTRAMUSCULAR; INTRAVENOUS ONCE
Status: COMPLETED | OUTPATIENT
Start: 2021-07-28 | End: 2021-07-28

## 2021-07-28 RX ORDER — MAGNESIUM HYDROXIDE 1200 MG/15ML
LIQUID ORAL CONTINUOUS PRN
Status: COMPLETED | OUTPATIENT
Start: 2021-07-28 | End: 2021-07-28

## 2021-07-28 RX ADMIN — DEXAMETHASONE SODIUM PHOSPHATE 10 MG: 10 INJECTION INTRAMUSCULAR; INTRAVENOUS at 08:28

## 2021-07-28 RX ADMIN — MIDAZOLAM HYDROCHLORIDE 2 MG: 1 INJECTION, SOLUTION INTRAMUSCULAR; INTRAVENOUS at 07:59

## 2021-07-28 RX ADMIN — Medication 0.2 MG: at 08:40

## 2021-07-28 RX ADMIN — PROPOFOL INJECTABLE EMULSION 200 MG: 10 INJECTION, EMULSION INTRAVENOUS at 08:24

## 2021-07-28 RX ADMIN — FENTANYL CITRATE 100 MCG: 50 INJECTION, SOLUTION INTRAMUSCULAR; INTRAVENOUS at 07:59

## 2021-07-28 RX ADMIN — LIDOCAINE HYDROCHLORIDE 50 MG: 10 INJECTION, SOLUTION EPIDURAL; INFILTRATION; INTRACAUDAL; PERINEURAL at 08:24

## 2021-07-28 RX ADMIN — CEFAZOLIN SODIUM 2000 MG: 2 SOLUTION INTRAVENOUS at 08:37

## 2021-07-28 RX ADMIN — ONDANSETRON 4 MG: 2 INJECTION, SOLUTION INTRAMUSCULAR; INTRAVENOUS at 08:28

## 2021-07-28 RX ADMIN — SODIUM CHLORIDE, POTASSIUM CHLORIDE, SODIUM LACTATE AND CALCIUM CHLORIDE: 600; 310; 30; 20 INJECTION, SOLUTION INTRAVENOUS at 08:20

## 2021-07-28 RX ADMIN — FENTANYL CITRATE 100 MCG: 50 INJECTION, SOLUTION INTRAMUSCULAR; INTRAVENOUS at 08:24

## 2021-07-28 RX ADMIN — SUGAMMADEX 200 MG: 100 INJECTION, SOLUTION INTRAVENOUS at 10:21

## 2021-07-28 RX ADMIN — SODIUM CHLORIDE, POTASSIUM CHLORIDE, SODIUM LACTATE AND CALCIUM CHLORIDE: 600; 310; 30; 20 INJECTION, SOLUTION INTRAVENOUS at 10:18

## 2021-07-28 RX ADMIN — FENTANYL CITRATE 50 MCG: 50 INJECTION, SOLUTION INTRAMUSCULAR; INTRAVENOUS at 08:50

## 2021-07-28 RX ADMIN — ROCURONIUM BROMIDE 50 MG: 10 INJECTION INTRAVENOUS at 08:24

## 2021-07-28 RX ADMIN — BUPIVACAINE HYDROCHLORIDE 25 ML: 5 INJECTION, SOLUTION EPIDURAL; INTRACAUDAL; PERINEURAL at 08:11

## 2021-07-28 RX ADMIN — FENTANYL CITRATE 50 MCG: 50 INJECTION, SOLUTION INTRAMUSCULAR; INTRAVENOUS at 09:53

## 2021-07-28 RX ADMIN — BUPIVACAINE HYDROCHLORIDE 15 ML: 5 INJECTION, SOLUTION EPIDURAL; INTRACAUDAL at 08:08

## 2021-07-28 RX ADMIN — FENTANYL CITRATE 50 MCG: 50 INJECTION, SOLUTION INTRAMUSCULAR; INTRAVENOUS at 11:06

## 2021-07-28 ASSESSMENT — PULMONARY FUNCTION TESTS
PIF_VALUE: 24
PIF_VALUE: 23
PIF_VALUE: 23
PIF_VALUE: 24
PIF_VALUE: 24
PIF_VALUE: 23
PIF_VALUE: 25
PIF_VALUE: 24
PIF_VALUE: 24
PIF_VALUE: 5
PIF_VALUE: 21
PIF_VALUE: 22
PIF_VALUE: 24
PIF_VALUE: 25
PIF_VALUE: 24
PIF_VALUE: 25
PIF_VALUE: 25
PIF_VALUE: 23
PIF_VALUE: 24
PIF_VALUE: 24
PIF_VALUE: 7
PIF_VALUE: 24
PIF_VALUE: 23
PIF_VALUE: 13
PIF_VALUE: 24
PIF_VALUE: 24
PIF_VALUE: 25
PIF_VALUE: 5
PIF_VALUE: 9
PIF_VALUE: 25
PIF_VALUE: 23
PIF_VALUE: 7
PIF_VALUE: 5
PIF_VALUE: 24
PIF_VALUE: 6
PIF_VALUE: 24
PIF_VALUE: 24
PIF_VALUE: 25
PIF_VALUE: 24
PIF_VALUE: 23
PIF_VALUE: 25
PIF_VALUE: 24
PIF_VALUE: 26
PIF_VALUE: 23
PIF_VALUE: 24
PIF_VALUE: 21
PIF_VALUE: 24
PIF_VALUE: 25
PIF_VALUE: 25
PIF_VALUE: 23
PIF_VALUE: 6
PIF_VALUE: 25
PIF_VALUE: 5
PIF_VALUE: 1
PIF_VALUE: 24
PIF_VALUE: 5
PIF_VALUE: 24
PIF_VALUE: 5
PIF_VALUE: 24
PIF_VALUE: 25
PIF_VALUE: 24
PIF_VALUE: 8
PIF_VALUE: 23
PIF_VALUE: 6
PIF_VALUE: 20
PIF_VALUE: 11
PIF_VALUE: 24
PIF_VALUE: 24
PIF_VALUE: 1
PIF_VALUE: 25
PIF_VALUE: 24
PIF_VALUE: 20
PIF_VALUE: 24
PIF_VALUE: 13
PIF_VALUE: 24
PIF_VALUE: 24
PIF_VALUE: 20
PIF_VALUE: 25
PIF_VALUE: 24
PIF_VALUE: 25
PIF_VALUE: 24
PIF_VALUE: 24
PIF_VALUE: 20
PIF_VALUE: 16
PIF_VALUE: 25
PIF_VALUE: 20
PIF_VALUE: 4
PIF_VALUE: 6
PIF_VALUE: 25
PIF_VALUE: 24
PIF_VALUE: 13
PIF_VALUE: 24
PIF_VALUE: 20
PIF_VALUE: 24
PIF_VALUE: 24
PIF_VALUE: 25
PIF_VALUE: 5
PIF_VALUE: 24
PIF_VALUE: 6
PIF_VALUE: 24
PIF_VALUE: 24
PIF_VALUE: 7
PIF_VALUE: 24
PIF_VALUE: 25
PIF_VALUE: 5
PIF_VALUE: 10
PIF_VALUE: 25
PIF_VALUE: 25
PIF_VALUE: 5
PIF_VALUE: 5
PIF_VALUE: 24
PIF_VALUE: 24
PIF_VALUE: 25
PIF_VALUE: 24
PIF_VALUE: 11
PIF_VALUE: 9
PIF_VALUE: 8
PIF_VALUE: 22
PIF_VALUE: 20
PIF_VALUE: 21
PIF_VALUE: 24
PIF_VALUE: 1
PIF_VALUE: 8
PIF_VALUE: 24
PIF_VALUE: 25
PIF_VALUE: 4
PIF_VALUE: 24
PIF_VALUE: 24
PIF_VALUE: 1
PIF_VALUE: 23
PIF_VALUE: 5
PIF_VALUE: 24
PIF_VALUE: 23
PIF_VALUE: 1
PIF_VALUE: 6
PIF_VALUE: 24
PIF_VALUE: 10
PIF_VALUE: 23
PIF_VALUE: 24
PIF_VALUE: 9

## 2021-07-28 ASSESSMENT — PAIN DESCRIPTION - ORIENTATION: ORIENTATION: RIGHT

## 2021-07-28 ASSESSMENT — ENCOUNTER SYMPTOMS: SHORTNESS OF BREATH: 0

## 2021-07-28 ASSESSMENT — LIFESTYLE VARIABLES: SMOKING_STATUS: 1

## 2021-07-28 ASSESSMENT — PAIN SCALES - GENERAL
PAINLEVEL_OUTOF10: 0
PAINLEVEL_OUTOF10: 0
PAINLEVEL_OUTOF10: 8
PAINLEVEL_OUTOF10: 0
PAINLEVEL_OUTOF10: 8
PAINLEVEL_OUTOF10: 0
PAINLEVEL_OUTOF10: 0

## 2021-07-28 ASSESSMENT — PAIN DESCRIPTION - PAIN TYPE: TYPE: ACUTE PAIN

## 2021-07-28 ASSESSMENT — PAIN DESCRIPTION - DESCRIPTORS: DESCRIPTORS: ACHING

## 2021-07-28 ASSESSMENT — PAIN DESCRIPTION - LOCATION: LOCATION: LEG

## 2021-07-28 NOTE — BRIEF OP NOTE
Brief Postoperative Note      Patient: Sarina Cogan  YOB: 1980  MRN: [id number]   CSN: [id number]     Date of Procedure: 7/28/2021    Pre-Op Diagnosis: Trimalleolar equivalent right ankle fracture    Post-Op Diagnosis: Trimalleolar equivalent right ankle fracture       Procedure(s):  Open treatment of right ankle fracture; CPT 02244    Surgeon(s):  Salomon Rinne, DO    Assistant:  Resident: Taty Brooks DO; Venus Bernal DO; Laura Soto DO    Anesthesia: General    Estimated Blood Loss (mL): 5 mL    Fluids: 700 mL crystalloid    Complications: None    Specimens:   * No specimens in log *    Implants:  Belia 8 hole posterolateral distal fibula plate (2x 3.5 mm screws, 2x 2.7 mm screws)  2x 2.0 mm lag screws  1x 2.7 mm lag screw    Implant Name Type Inv. Item Serial No.  Lot No. LRB No. Used Action   PLATE BNE [id number] 8 H R POSTEROLATERAL DST PERIARTC FIBULAR S  PLATE BNE [id number] 8 H R POSTEROLATERAL DST PERIARTC FIBULAR S  [employer]-  Right 1 Implanted   SCREW BNE L18MM DIA2.7MM SRI ANK FT S STL ST SAMANTHA  SCREW BNE L18MM DIA2.7MM SRI ANK FT S STL ST SAMANTHA  [employer]EL[employer]-WD  Right 1 Implanted   SCREW BNE L24MM DIA2.7MM SM HEX HD DIA2.5MM SRI S STL ST  SCREW BNE L24MM DIA2.7MM SM HEX HD DIA2.5MM SRI S STL ST  [employer]ELIA [employer] TRAUMA-WD  Right 1 Implanted   SCREW BNE L46MM DIA2.7MM SRI ST SM HEX  SCREW BNE L46MM DIA2.7MM SRI ST SM HEX  [employer] TRAUMA-WD  Right 1 Implanted   SCREW BNE L18MM DIA3. 5MM HD DIA2.7MM PERIARTC SRI S STL ST  SCREW BNE L18MM DIA3. 5MM HD DIA2.7MM PERIARTC SRI S STL ST  [employer]UMA-  Right 1 Implanted   SCREW BONE MINI L20MM D2MM CRTCL SELF TPPNG WTH HEX RCSS  SCREW BONE MINI L20MM D2MM CRTCL SELF TPPNG WTH HEX RCSS  [employer]-WD  Right 2 Implanted         Drains: * No LDAs found *    Findings: Right trimalleolar equivalent ankle fracture with intraarticular comminution of posterior malleolar fracture. --------------------------------------------------------------  Ricardo Michel DO 7/28/2021 7:32 PM

## 2021-07-28 NOTE — FLOWSHEET NOTE
Dr Irma Sweeney to the bedside, time out performed @ [phone], Pt monitored, 02, popiteal  and Saphenous nerve blocks completed using 0.5% Bupivacaine, 25 mL 0.5% Bupivacaine injected into popiteal site. 15 mL 0.5% Bupivacaine injected into saphenous site,  pt tolerated procedure well,  Site CDI, (see charting)  Versed Given: 2 mg  Fentanyl 100 mcg  Start time: 0759  End Time:  0808     Approx 5 mL of 1% Lidocaine used as local. Approx 2.5 mL into site.

## 2021-07-28 NOTE — ANESTHESIA PROCEDURE NOTES
Peripheral Block    Patient location during procedure: pre-op  Start time: 7/28/2021 8:05 AM  End time: 7/28/2021 8:08 AM  Staffing  Performed: anesthesiologist   Anesthesiologist: Johnny Strange MD  Preanesthetic Checklist  Completed: patient identified, IV checked, site marked, risks and benefits discussed, surgical consent, monitors and equipment checked, pre-op evaluation, timeout performed, anesthesia consent given, oxygen available and patient being monitored  Peripheral Block  Patient position: supine  Prep: ChloraPrep  Patient monitoring: cardiac monitor, continuous pulse ox, frequent blood pressure checks and IV access  Block type: Femoral  Laterality: right  Injection technique: single-shot  Guidance: ultrasound guided  Local infiltration: lidocaine  Infiltration strength: 1 %  Dose: 3 mL  Adductor canal  Provider prep: mask and sterile gloves  Local infiltration: lidocaine  Needle  Needle type: combined needle/nerve stimulator   Needle gauge: 20 G  Needle length: 10 cm  Needle localization: ultrasound guidance  Assessment  Injection assessment: negative aspiration for heme, no paresthesia on injection and local visualized surrounding nerve on ultrasound  Paresthesia pain: none  Slow fractionated injection: yes  Hemodynamics: stable  Additional Notes  U/S [id number].  (1) Under ultrasound guidance, a 20 gauge needle was inserted and placed in close proximity to the femoral nerve in the adductor canal.  (2) Ultrasound was also used to visualize the spread of the anesthetic in close proximity to the nerve being blocked. (3) The nerve appeared anatomically normal, and (4) there were no apparent abnormal pathological findings on the image that were readily visible and related to the nerve being blocked. (5) A permanent ultrasound image was saved in the patient's record.           Medications Administered  Bupivacaine (MARCAINE) PF injection 0.5%, 15 mL  Reason for block: post-op pain management and at surgeon's

## 2021-07-28 NOTE — ANESTHESIA PROCEDURE NOTES
Peripheral Block    Patient location during procedure: pre-op  Start time: 7/28/2021 7:59 AM  End time: 7/28/2021 8:04 AM  Staffing  Performed: anesthesiologist   Anesthesiologist: Dejan Saeed MD  Preanesthetic Checklist  Completed: patient identified, IV checked, site marked, risks and benefits discussed, surgical consent, monitors and equipment checked, pre-op evaluation, timeout performed, anesthesia consent given, oxygen available and patient being monitored  Peripheral Block  Patient position: supine  Prep: ChloraPrep  Patient monitoring: cardiac monitor, continuous pulse ox, frequent blood pressure checks and IV access  Block type: Sciatic  Laterality: right  Injection technique: single-shot  Guidance: ultrasound guided  Local infiltration: lidocaine  Infiltration strength: 1 %  Dose: 3 mL  Popliteal  Provider prep: mask and sterile gloves  Local infiltration: lidocaine  Needle  Needle type: combined needle/nerve stimulator   Needle gauge: 20 G  Needle length: 10 cm  Needle localization: ultrasound guidance  Assessment  Injection assessment: negative aspiration for heme, no paresthesia on injection and local visualized surrounding nerve on ultrasound  Paresthesia pain: none  Slow fractionated injection: yes  Hemodynamics: stable  Additional Notes  U/S [id number].  (1) Under ultrasound guidance, a 20 gauge needle was inserted and placed in close proximity to the sciatic nerve bifurcation above the popliteal fossa.  (2) Ultrasound was also used to visualize the spread of the anesthetic in close proximity to the nerve being blocked. (3) The nerve appeared anatomically normal, and (4) there were no apparent abnormal pathological findings on the image that were readily visible and related to the nerve being blocked. (5) A permanent ultrasound image was saved in the patient's record.           Medications Administered  Bupivacaine (MARCAINE) PF injection 0.5%, 25 mL  Reason for block: post-op pain management and at

## 2021-07-28 NOTE — ANESTHESIA POSTPROCEDURE EVALUATION
Department of Anesthesiology  Postprocedure Note    Patient: Nicol Urbina  MRN: [id number]  YOB: 1980  Date of evaluation: 7/28/2021  Time:  12:07 PM     Procedure Summary     Date: 07/28/21 Room / Location: [address]    Anesthesia Start: [place] Last Anesthesia Stop: 8304    Procedure: ANKLE OPEN REDUCTION INTERNAL FIXATION RIGHT (Right Ankle) Diagnosis: (RIGHT ANKLE FRACTURE)    Surgeons: Maryam Elizabeth DO Responsible Provider: Dilcia Blair MD    Anesthesia Type: general, regional ASA Status: 2          Anesthesia Type: general, regional    Flora Phase I: Flora Score: 10    Flora Phase II:      Last vitals: Reviewed and per EMR flowsheets.        Anesthesia Post Evaluation    Patient location during evaluation: PACU  Patient participation: complete - patient participated  Level of consciousness: awake and alert  Pain score: 0  Airway patency: patent  Nausea & Vomiting: no nausea and no vomiting  Complications: no  Cardiovascular status: hemodynamically stable  Respiratory status: acceptable, spontaneous ventilation and room air  Hydration status: euvolemic
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
## 2021-07-28 NOTE — ANESTHESIA POSTPROCEDURE EVALUATION
Department of Anesthesiology  Postprocedure Note    Patient: Catalina Ascencio  MRN: [id number]  YOB: 1980  Date of evaluation: 7/28/2021  Time:  12:06 PM     Procedure Summary     Date: 07/28/21 Room / Location: [address]    Anesthesia Start: Peg Cos Anesthesia Stop: 2061    Procedure: ANKLE OPEN REDUCTION INTERNAL FIXATION RIGHT (Right Ankle) Diagnosis: (RIGHT ANKLE FRACTURE)    Surgeons: Tina Alonzo DO Responsible Provider: Dejan Saeed MD    Anesthesia Type: general, regional ASA Status: 2          Anesthesia Type: general, regional    Flora Phase I: Flora Score: 10    Flora Phase II:      Last vitals: Reviewed and per EMR flowsheets.        Anesthesia Post Evaluation    Patient location during evaluation: PACU  Patient participation: complete - patient participated  Level of consciousness: awake and alert  Pain score: 0  Airway patency: patent  Nausea & Vomiting: no nausea and no vomiting  Complications: no  Cardiovascular status: hemodynamically stable  Respiratory status: acceptable, spontaneous ventilation and room air  Hydration status: euvolemic
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
## 2021-07-29 NOTE — OP NOTE
[address] [address]                                OPERATIVE REPORT    PATIENT NAME: Tierra Rodriguez                    :        1980  MED REC NO:   [id number]                             ROOM:  ACCOUNT NO:   [de-identified]                           ADMIT DATE: 2021  PROVIDER:     Oxana Mckeon D.O.    DATE OF PROCEDURE:  2021    PREOPERATIVE DIAGNOSIS:  Right trimalleolar equivalent ankle fracture. POSTOPERATIVE DIAGNOSIS:  Right trimalleolar equivalent ankle fracture. PROCEDURE:  Open treatment of right ankle fracture. SURGEON:  Raj Canales DO    ASSISTANTS:  Oxana Mckeon DO; Yesenia Velez DO; Oseas Yeh DO    ANESTHESIA:  General.    ESTIMATED BLOOD LOSS:  5 mL. FLUIDS:  700 mL crystalloid. COMPLICATIONS:  None. SPECIMENS:  None. IMPLANTS:  Nahed 8-hole posterolateral distal fibular plate with two  1.0-HD lag screws and one 2.7-mm lag screw. INDICATIONS FOR PROCEDURE:  The patient is a 62-year-old male who  sustained a trimalleolar equivalent fracture dislocation of his right  ankle, which was initially close reduced in the emergency department and  placed into a short-leg splint for followup in our clinic. A CT was  obtained which demonstrated a intra-articular posterior malleolar  fracture with comminution as well as a long oblique distal fibular  fracture. Due to the unstable nature of this fracture, he would require  surgical fixation. This was discussed with the patient and he elected  to move forward with the procedure after risks and benefits were  discussed. DESCRIPTION OF PROCEDURE:  The patient was met in the preoperative area,  surgical consent was confirmed as well as his operative extremity.   He  was then transferred from the preoperative area to the operative suite  and positioned in the supine position on a 3080 table and induced under  general anesthesia by the anesthesia team without any complications. Secured to the table with a lap belt after all bony prominences were  well padded. A bump was applied under his right hip and his leg was  elevated on a bone foam.  A time-out was performed with all members of  the team present and in agreement. His right lower extremity was  sterilely prepped and draped in a standard fashion and the tourniquet  was inflated to 250 mmHg after an Esmarch was used for exsanguination. At this point, we began first with a posterolateral incision just  posterior to border of the fibula. Flaps were created down to the  fascia of the peroneal tendons. A longitudinal incision was made  through the fascia and the tendons were retracted initially posteriorly  to fully delineate the fibular fracture. The fibular fracture was  cleared of any fibrous tissue or callus taking care to preserve the  periosteum. There was a long oblique fracture with some mild  comminution at the proximal apex. There was a quite a bit of difficulty  achieving length of the fracture, so we elected to move forward with  mobilization of posterior malleolar fragment. We retracted the peroneal  tendons anteriorly and developed a plane between the FHL and the  peroneal tendons identifying the posterior malleolar fracture. These  had migrated proximally, but the periosteum was still intact. We were  able to identify the small area of comminution and intra-articular  fragment by booking open the fracture site. This was removed with a  pituitary rongeur. We were unable to achieve our length on the  posterior malleolar fragment due to the fibula not been reduced, so  we move forward with fibular reduction. Using pointed-reduction  forceps, the fibula was reduced to near anatomic position.   We then  using a lag-by technique fashion, placed two 2.0 mm Nahed screws,  achieving excellent fixation within the fracture site and compression  across the fracture site. At this point, the pointed reduction clamps  were removed and we turned our attention towards the posterior malleolar  fragment. This was better reduced at this point. We  placed a 2.7-mm lag screw in a lag-by technique fashion from the  posterior lateral cortex to the anterior medial cortex. Once each  fracture was aligned appropriately, we then placed an 8-hole Nahed  posterolateral fibular plate to neutralize the lag screws. We secured the plate with 2 proximal and 2 distal screws. At  this point, the wound was copiously irrigated with normal saline, and  final images were obtained demonstrating a near-anatomic alignment of  our fracture with adequate fixation. We then closed the fascia over the  peroneal tendons with 0 Vicryl followed by 3-0 nylon for the skin in a  vertical mattress fashion. We then placed a posterior short-leg splint  after a sterile dressing. The patient was awoken from anesthesia  without any complications and transferred to PACU, where care was  assumed by the PACU nurses with the patient is stable condition. Total  tourniquet time was under 2 hours. Dr. Cem Bowie was present for the entirety of the procedure.         Conrad Mondragon D.O.    D: 07/28/2021 16:23:26       T: 07/28/2021 16:29:51     [email or web address]  Job#: [id number]     Doc#: [id number]    CC:

## 2021-08-10 ENCOUNTER — OFFICE VISIT (OUTPATIENT)
Dept: ORTHOPEDIC SURGERY | Age: 41
End: 2021-08-10

## 2021-08-10 VITALS — BODY MASS INDEX: 30.92 KG/M2 | WEIGHT: 197 LBS | HEIGHT: 67 IN

## 2021-08-10 DIAGNOSIS — G89.18 POST-OPERATIVE PAIN: ICD-10-CM

## 2021-08-10 DIAGNOSIS — S82.891D CLOSED FRACTURE OF RIGHT ANKLE WITH ROUTINE HEALING, SUBSEQUENT ENCOUNTER: Primary | ICD-10-CM

## 2021-08-10 PROCEDURE — 99024 POSTOP FOLLOW-UP VISIT: CPT | Performed by: ORTHOPAEDIC SURGERY

## 2021-08-10 RX ORDER — OXYCODONE HYDROCHLORIDE AND ACETAMINOPHEN 5; 325 MG/1; MG/1
1 TABLET ORAL
Qty: 28 TABLET | Refills: 0 | Status: SHIPPED | OUTPATIENT
Start: 2021-08-10 | End: 2021-08-19 | Stop reason: SDUPTHER

## 2021-08-10 NOTE — PROGRESS NOTES
right ankle splint and placed in a removable boot.  -He is okay to shower and lightly wash incision but do not soak it in the bath at this time.  -Continue nonweightbearing status to the right lower extremity.  -Encourage him to come out of his boot when he is at home in bed or sitting to work on passive range of motion.  -He is out of pain medicine.   Percocet refill at this time.  -Follow-up in 4 weeks for his 6-week follow-up with x-rays out of his boot    Electronically signed by Adin Bentley DO on 8/10/2021 at 10:51 AM

## 2021-08-19 DIAGNOSIS — G89.18 POST-OPERATIVE PAIN: ICD-10-CM

## 2021-08-19 DIAGNOSIS — S82.891D CLOSED FRACTURE OF RIGHT ANKLE WITH ROUTINE HEALING, SUBSEQUENT ENCOUNTER: ICD-10-CM

## 2021-08-19 RX ORDER — OXYCODONE HYDROCHLORIDE AND ACETAMINOPHEN 5; 325 MG/1; MG/1
1 TABLET ORAL
Qty: 28 TABLET | Refills: 0 | Status: SHIPPED | OUTPATIENT
Start: 2021-08-19 | End: 2021-08-26

## 2021-09-03 DIAGNOSIS — S82.891D CLOSED FRACTURE OF RIGHT ANKLE WITH ROUTINE HEALING, SUBSEQUENT ENCOUNTER: Primary | ICD-10-CM

## 2021-09-07 ENCOUNTER — OFFICE VISIT (OUTPATIENT)
Dept: ORTHOPEDIC SURGERY | Age: 41
End: 2021-09-07

## 2021-09-07 DIAGNOSIS — S82.851D CLOSED TRIMALLEOLAR FRACTURE OF RIGHT ANKLE WITH ROUTINE HEALING, SUBSEQUENT ENCOUNTER: Primary | ICD-10-CM

## 2021-09-07 PROCEDURE — 99024 POSTOP FOLLOW-UP VISIT: CPT | Performed by: ORTHOPAEDIC SURGERY

## 2021-09-07 RX ORDER — HYDROCODONE BITARTRATE AND ACETAMINOPHEN 5; 325 MG/1; MG/1
1 TABLET ORAL EVERY 6 HOURS PRN
Qty: 28 TABLET | Refills: 0 | Status: SHIPPED | OUTPATIENT
Start: 2021-09-07 | End: 2021-09-14

## 2021-09-07 NOTE — PROGRESS NOTES
[facility]  [facility]  [address] [address]  Dept: [phone]  Dept Fax: [phone]        Orthopaedic Trauma Clinic Follow Up      Subjective:   Date of Surgery: 7/28/21 - ORIF right trimalleolar equivalent ankle fracture    Duarte Rosado is a 38y.o. year old male who presents to the clinic today for routine followup regarding his right trimalleolar equivalent ankle fracture ORIF. DOS was on 7/28/2021, therefore we are roughly 6 weeks post-op. Patient is doing quite well, has been wearing his CAM boot appropriately. He states that he has been relatively noncompliant with his weightbearing restrictions, and has been placing weight on his right foot intermittently. Denies any new numbness or tingling, fever or chills. Denies any new falls or injuries. Currently unemployed. Has been using crutches most of the time. He has been taking oxycodone for pain, but has recently ran out. Review of Systems  Gen: no fever, chills, malaise  CV: no chest pain or palpitations  Resp: no cough or shortness of breath  GI: no nausea, vomiting, diarrhea, or constipation  Neuro: no numbness, tingling, or weakness  Msk: Right ankle pain  10 remaining systems reviewed and negative    Objective : There were no vitals filed for this visit. There is no height or weight on file to calculate BMI. General: No acute distress, resting comfortably in the clinic  Neuro: Alert. oriented  Eyes: Extra-ocular muscles intact  Pulm: Respirations unlabored and regular. Skin: Warm, well perfused  Psych: Patient has good fund of knowledge and displays understanging of exam, diagnosis, and plan. MSK: RLE: Incisions completely healed with no gross signs of infection or wound dehiscence. Active ankle motion 10 degrees dorsiflexion past neutral, and 25 degrees plantar flexion past neutral.  Minimal pain with any eversion or inversion.   No gross motor or sensory deficits on exam, patient able to dorsiflex and plantarflex the ankle as well as flex and extend the distal digits. Extremity warm and well perfused with a 2+ DP pulse. Radiology:  History: Right trimalleolar equivalent ankle fracture, s/p ORIF    Comparison: 7/28/2021    Findings: 3 views of the right ankle including AP, mortise, lateral showing a right trimalleolar equivalent ankle fracture s/p ORIF with plates and screw construct. There are no signs of hardware failure including screw loosening or backing out. No obvious acute fractures or dislocations present. Mild possible soft tissue calcification posteriorly along the fibula versus callus formation. Impression: Progressive interval healing of a right trimalleolar equivalent ankle fracture s/p ORIF. Assessment:   38y.o. year old male with right trimalleolar ankle fracture s/p ORIF on 7/28/2021. Plan:      - Patient can begin weaning out of his CAM boot today and weight bear as tolerated. Use crutches as needed but goal is to transition to full weight bearing by next visit. - Given an Rx for physical therapy today to improve ankle range of motion and strengthening.   - Rx for Norco given today, sent to pharmacy. Transitioning off of narcotics at this point. Use as needed. - Follow up in 6 weeks, will obtain repeat right ankle radiographs at that time. Follow up:Return in about 6 weeks (around 10/19/2021) for X-rays out of cast/splint/brace/sling. Orders Placed This Encounter   Medications    HYDROcodone-acetaminophen (NORCO) 5-325 MG per tablet     Sig: Take 1 tablet by mouth every 6 hours as needed for Pain for up to 7 days. Intended supply: 7 days.  Take lowest dose possible to manage pain     Dispense:  28 tablet     Refill:  0     Reduce doses taken as pain becomes manageable          Orders Placed This Encounter   Procedures   [facility] - [facility]     Referral Priority:   Routine     Referral Type:   Eval and Treat     Referral Reason: Specialty Services Required     Requested Specialty:   Physical Therapy     Number of Visits Requested:   1       Electronically signed by Mickey Diamond DO on 9/7/2021 at 4:03 PM

## 2021-09-16 NOTE — TELEPHONE ENCOUNTER
Patient called and would like refill on his pain medication, send order to [facility]- please advise and reach out to patient when ordered @ [phone] thank you

## 2021-09-16 NOTE — TELEPHONE ENCOUNTER
Advised patient to transition to OTC medications. Patient states he has a history of stomach ulcer and can not take ibuprofen or aleve. Will take tylenol.

## 2021-10-18 DIAGNOSIS — S82.891D CLOSED FRACTURE OF RIGHT ANKLE WITH ROUTINE HEALING, SUBSEQUENT ENCOUNTER: Primary | ICD-10-CM

## 2021-10-19 ENCOUNTER — OFFICE VISIT (OUTPATIENT)
Dept: ORTHOPEDIC SURGERY | Age: 41
End: 2021-10-19

## 2021-10-19 DIAGNOSIS — S82.891D CLOSED FRACTURE OF RIGHT ANKLE WITH ROUTINE HEALING, SUBSEQUENT ENCOUNTER: Primary | ICD-10-CM

## 2021-10-19 PROCEDURE — 99024 POSTOP FOLLOW-UP VISIT: CPT | Performed by: ORTHOPAEDIC SURGERY

## 2021-10-20 NOTE — PROGRESS NOTES
[facility]  [facility]  [address] [address]  Dept: [phone]  Dept Fax: [phone]        Orthopaedic Trauma Clinic Follow Up      Subjective:   Date of Surgery: DOS 7/28/21- ( 12 weeks) Right trimal ankle ORIF     Arnold Waterman is a 42y.o. year old male who presents to the clinic today for routine follow up regarding his right ankle trimalleolar ORIF. Patient is doing well however he does note some continued pain and stiffness in his right ankle. He notes pain primarily in his ankle joint with weightbearing. He also notes some pain proximally in his medial tibia. He has been ambulating with normal shoes. He has not gone to physical therapy despite her previous recommendation. He is still off work at this point but is set to return at the beginning November. Denies any numbness or tingling. Denies any other orthopedic complaints at this time. Review of Systems  Gen: no fever, chills, malaise  CV: no chest pain or palpitations  Resp: no cough or shortness of breath  GI: no nausea, vomiting, diarrhea, or constipation  Neuro: no seizures, vertigo, or headache  Msk: Right ankle pain  10 remaining systems reviewed and negative    Objective : There were no vitals filed for this visit. There is no height or weight on file to calculate BMI. General: No acute distress, resting comfortably in the clinic  Neuro: alert. oriented  Eyes: Extra-ocular muscles intact  Pulm: Respirations unlabored and regular. Skin: warm, well perfused  Psych:   Patient has good fund of knowledge and displays understanding of exam, diagnosis, and plan. MSK:  Ankle exam: Mild tenderness palpation of the anterior medial joint line as well as the medial tibia. Full ankle range of motion no pain with resisted dorsiflexion and plantar flexion. Walks with a mildly antalgic gait. Compartments soft. 2+ DP pulse. TA/EHL/FHL/GS motor intact.  Deep and Superficial Peroneal/Saphenous/Sural/Plantar SILT. Radiology:  History: Right ankle fracture    Comparison: 9/7/2021    Findings: AP/lateral/mortise views of the right ankle demonstrate evidence of previous trimalleolar ankle fracture status post open reduction total fixation. Fracture lines are no longer visible and orthopedic hardware remains in place without evidence of loosening. No lytic or blastic lesions. No additional fractures noted. Normal joint alignment. Impression: 1. Ankle trimalleolar fracture status post ORIF, routine healing. Assessment:   42y.o. year old male with right trimalleolar ankle fracture, date of surgery 7/20/2021 (11 weeks 6 days)    Plan:   1. Discussed physical therapy with the patient again to regain his balance and strength in his right ankle. Patient was initially reluctant to go to physical therapy but is now agreeable and understands that he can likely return to work quicker without pain after undergoing physical therapy. New order for physical therapy placed today. 2.  We are okay with him returning to work at the beginning of November. He is to call if he needs a letter from us around that time. 3.  Follow-up in 8 weeks for repeat evaluation of his right ankle. Follow up:Return in about 8 weeks (around 12/14/2021) for evaluation without X-rays. No orders of the defined types were placed in this encounter.          Orders Placed This Encounter   Procedures   [facility]     Referral Priority:   Routine     Referral Type:   Eval and Treat     Referral Reason:   Specialty Services Required     Requested Specialty:   Physical Therapy     Number of Visits Requested:   1       Electronically signed by Tanisha Alexander DO, DO on 10/20/2021 at 7:44 AM    This note is created with the assistance of a speech recognition program.  While intending to generate a document that actually reflects the content of the visit, the document can still have some errors including those of syntax and sound a like substitutions which may escape proof reading.   In such instances, actual meaning can be extrapolated by contextual diversion

## (undated) DEVICE — BANDAGE COMPR W6INXL12FT SMOOTH FOR LIMB EXSANG ESMARCH

## (undated) DEVICE — DRAPE,U/ SHT,SPLIT,PLAS,STERIL: Brand: MEDLINE

## (undated) DEVICE — SUTURE VCRL SZ 0 L18IN ABSRB UD L36MM CT-1 1/2 CIR J840D

## (undated) DEVICE — GOWN,SIRUS,NONRNF,SETINSLV,2XL,18/CS: Brand: MEDLINE

## (undated) DEVICE — GOWN,AURORA,NONREINFORCED,LARGE: Brand: MEDLINE

## (undated) DEVICE — BNDG,ELSTC,MATRIX,STRL,6"X5YD,LF,HOOK&LP: Brand: MEDLINE

## (undated) DEVICE — Z DISCONTINUED USE 2272124 DRAPE SURG XL N INVASIVE 2 LAYR DISP

## (undated) DEVICE — TUBING AMB

## (undated) DEVICE — TOWEL,OR,DSP,ST,NATURAL,DLX,4/PK,20PK/CS: Brand: MEDLINE

## (undated) DEVICE — SVMMC ORTH SPL DRP PK

## (undated) DEVICE — BNDG,ELSTC,MATRIX,STRL,4"X5YD,LF,HOOK&LP: Brand: MEDLINE

## (undated) DEVICE — GARMENT,MEDLINE,DVT,INT,CALF,MED, GEN2: Brand: MEDLINE

## (undated) DEVICE — GAUZE,SPONGE,FLUFF,6"X6.75",STRL,5/TRAY: Brand: MEDLINE

## (undated) DEVICE — GLOVE ORANGE PI 8   MSG9080

## (undated) DEVICE — CYSTO/BLADDER IRRIGATION SET, REGULATING CLAMP

## (undated) DEVICE — BIT DRL L100MM DIA2.5MM FOR SM FRAG UNIV LOK SYS

## (undated) DEVICE — DRESSING,GAUZE,XEROFORM,CURAD,1"X8",ST: Brand: CURAD

## (undated) DEVICE — FORCEPS BX L240CM WRK CHN 2.8MM STD CAP W/ NDL MIC MESH

## (undated) DEVICE — GLOVE ORANGE PI 8 1/2   MSG9085

## (undated) DEVICE — SOLUTION SCRB 4OZ 4% CHG H2O AIDED FOR PREOPERATIVE SKIN

## (undated) DEVICE — ZIMMER® STERILE DISPOSABLE TOURNIQUET CUFF WITH PROTECTIVE SLEEVE AND PLC, DUAL PORT, SINGLE BLADDER, 24 IN. (61 CM)

## (undated) DEVICE — C-ARM: Brand: UNBRANDED

## (undated) DEVICE — GLOVE ORTHO 8   MSG9480

## (undated) DEVICE — GOWN,SIRUS,NONRNF,SETINSLV,XL,20/CS: Brand: MEDLINE

## (undated) DEVICE — PADDING CAST W6INXL4YD COT LO LINTING WYTEX

## (undated) DEVICE — Device

## (undated) DEVICE — C-ARMOR C-ARM EQUIPMENT COVERS CLEAR STERILE UNIVERSAL FIT 12 PER CASE: Brand: C-ARMOR

## (undated) DEVICE — INTENDED FOR TISSUE SEPARATION, AND OTHER PROCEDURES THAT REQUIRE A SHARP SURGICAL BLADE TO PUNCTURE OR CUT.: Brand: BARD-PARKER ® CARBON RIB-BACK BLADES

## (undated) DEVICE — BLADE CLIPPER GEN PURP NS

## (undated) DEVICE — PADDING,UNDERCAST,COTTON, 4"X4YD STERILE: Brand: MEDLINE

## (undated) DEVICE — TUBING SUCT 12FR MAL ALUM SHFT FN CAP VENT UNIV CONN W/ OBT

## (undated) DEVICE — APPLICATOR MEDICATED 26 CC SOLUTION HI LT ORNG CHLORAPREP

## (undated) DEVICE — BANDAGE COBAN 6 IN WND 6INX5YD FOAM

## (undated) DEVICE — GLOVE,EXAM,NITRILE,RESTORE,OAT SENSE,L: Brand: MEDLINE

## (undated) DEVICE — SUTURE NONABSORBABLE MONOFILAMENT 3-0 PS-1 18 IN BLK ETHILON 1663H

## (undated) DEVICE — SUTURE VCRL SZ 2-0 L18IN ABSRB UD CT-1 L36MM 1/2 CIR J839D

## (undated) DEVICE — K WIRE FIX L150MM DIA1.6MM TRCR PNT 1 END FOR SM FRAG UNIV
Type: IMPLANTABLE DEVICE | Site: ANKLE | Status: NON-FUNCTIONAL
Removed: 2021-07-28

## (undated) DEVICE — DRAPE,REIN 53X77,STERILE: Brand: MEDLINE

## (undated) DEVICE — GLOVE ORANGE PI 7 1/2   MSG9075